# Patient Record
Sex: MALE | Race: WHITE | NOT HISPANIC OR LATINO | URBAN - METROPOLITAN AREA
[De-identification: names, ages, dates, MRNs, and addresses within clinical notes are randomized per-mention and may not be internally consistent; named-entity substitution may affect disease eponyms.]

---

## 2016-12-21 RX ORDER — AMIODARONE HYDROCHLORIDE 400 MG/1
4 TABLET ORAL
Qty: 30 | Refills: 0 | COMMUNITY
Start: 2016-12-21 | End: 2017-01-20

## 2017-01-12 ENCOUNTER — OUTPATIENT (OUTPATIENT)
Dept: OUTPATIENT SERVICES | Facility: HOSPITAL | Age: 57
LOS: 1 days | End: 2017-01-12
Payer: COMMERCIAL

## 2017-01-12 DIAGNOSIS — G47.10 HYPERSOMNIA, UNSPECIFIED: ICD-10-CM

## 2017-01-12 DIAGNOSIS — Z90.89 ACQUIRED ABSENCE OF OTHER ORGANS: Chronic | ICD-10-CM

## 2017-01-12 DIAGNOSIS — Z98.890 OTHER SPECIFIED POSTPROCEDURAL STATES: Chronic | ICD-10-CM

## 2017-01-12 PROCEDURE — 95810 POLYSOM 6/> YRS 4/> PARAM: CPT

## 2017-01-12 PROCEDURE — 95810 POLYSOM 6/> YRS 4/> PARAM: CPT | Mod: 26

## 2017-01-25 ENCOUNTER — APPOINTMENT (OUTPATIENT)
Dept: PULMONOLOGY | Facility: CLINIC | Age: 57
End: 2017-01-25

## 2017-01-31 ENCOUNTER — APPOINTMENT (OUTPATIENT)
Dept: PULMONOLOGY | Facility: CLINIC | Age: 57
End: 2017-01-31

## 2017-02-15 ENCOUNTER — APPOINTMENT (OUTPATIENT)
Dept: PULMONOLOGY | Facility: CLINIC | Age: 57
End: 2017-02-15

## 2017-02-15 VITALS
HEIGHT: 70 IN | RESPIRATION RATE: 16 BRPM | WEIGHT: 232 LBS | DIASTOLIC BLOOD PRESSURE: 100 MMHG | BODY MASS INDEX: 33.21 KG/M2 | SYSTOLIC BLOOD PRESSURE: 142 MMHG | OXYGEN SATURATION: 96 % | HEART RATE: 54 BPM

## 2017-02-15 DIAGNOSIS — Z87.09 PERSONAL HISTORY OF OTHER DISEASES OF THE RESPIRATORY SYSTEM: ICD-10-CM

## 2017-02-15 DIAGNOSIS — I25.10 ATHEROSCLEROTIC HEART DISEASE OF NATIVE CORONARY ARTERY W/OUT ANGINA PECTORIS: ICD-10-CM

## 2017-02-15 DIAGNOSIS — Z86.79 PERSONAL HISTORY OF OTHER DISEASES OF THE CIRCULATORY SYSTEM: ICD-10-CM

## 2017-02-15 DIAGNOSIS — R06.2 WHEEZING: ICD-10-CM

## 2017-02-15 DIAGNOSIS — J45.901 UNSPECIFIED ASTHMA WITH (ACUTE) EXACERBATION: ICD-10-CM

## 2017-03-29 ENCOUNTER — OUTPATIENT (OUTPATIENT)
Dept: OUTPATIENT SERVICES | Facility: HOSPITAL | Age: 57
LOS: 1 days | End: 2017-03-29
Payer: COMMERCIAL

## 2017-03-29 DIAGNOSIS — Z98.890 OTHER SPECIFIED POSTPROCEDURAL STATES: Chronic | ICD-10-CM

## 2017-03-29 DIAGNOSIS — Z90.89 ACQUIRED ABSENCE OF OTHER ORGANS: Chronic | ICD-10-CM

## 2017-03-29 DIAGNOSIS — G47.33 OBSTRUCTIVE SLEEP APNEA (ADULT) (PEDIATRIC): ICD-10-CM

## 2017-03-29 PROCEDURE — 95811 POLYSOM 6/>YRS CPAP 4/> PARM: CPT

## 2017-03-29 PROCEDURE — 95811 POLYSOM 6/>YRS CPAP 4/> PARM: CPT | Mod: 26

## 2017-06-06 ENCOUNTER — INPATIENT (INPATIENT)
Facility: HOSPITAL | Age: 57
LOS: 1 days | Discharge: HOME | DRG: 309 | End: 2017-06-08
Attending: HOSPITALIST | Admitting: HOSPITALIST
Payer: COMMERCIAL

## 2017-06-06 VITALS
SYSTOLIC BLOOD PRESSURE: 158 MMHG | RESPIRATION RATE: 16 BRPM | OXYGEN SATURATION: 97 % | WEIGHT: 240.08 LBS | HEIGHT: 71 IN | TEMPERATURE: 99 F | DIASTOLIC BLOOD PRESSURE: 96 MMHG | HEART RATE: 49 BPM

## 2017-06-06 DIAGNOSIS — T46.2X1A POISONING BY OTHER ANTIDYSRHYTHMIC DRUGS, ACCIDENTAL (UNINTENTIONAL), INITIAL ENCOUNTER: ICD-10-CM

## 2017-06-06 DIAGNOSIS — Z98.890 OTHER SPECIFIED POSTPROCEDURAL STATES: Chronic | ICD-10-CM

## 2017-06-06 DIAGNOSIS — Z90.89 ACQUIRED ABSENCE OF OTHER ORGANS: Chronic | ICD-10-CM

## 2017-06-06 PROCEDURE — 99223 1ST HOSP IP/OBS HIGH 75: CPT

## 2017-06-06 RX ORDER — LISINOPRIL 2.5 MG/1
20 TABLET ORAL DAILY
Qty: 0 | Refills: 0 | Status: DISCONTINUED | OUTPATIENT
Start: 2017-06-06 | End: 2017-06-07

## 2017-06-06 RX ORDER — ATORVASTATIN CALCIUM 80 MG/1
40 TABLET, FILM COATED ORAL AT BEDTIME
Qty: 0 | Refills: 0 | Status: DISCONTINUED | OUTPATIENT
Start: 2017-06-06 | End: 2017-06-08

## 2017-06-06 RX ORDER — APIXABAN 2.5 MG/1
5 TABLET, FILM COATED ORAL
Qty: 0 | Refills: 0 | Status: DISCONTINUED | OUTPATIENT
Start: 2017-06-06 | End: 2017-06-08

## 2017-06-06 RX ORDER — CLOPIDOGREL BISULFATE 75 MG/1
75 TABLET, FILM COATED ORAL DAILY
Qty: 0 | Refills: 0 | Status: DISCONTINUED | OUTPATIENT
Start: 2017-06-06 | End: 2017-06-08

## 2017-06-06 RX ORDER — ASPIRIN/CALCIUM CARB/MAGNESIUM 324 MG
325 TABLET ORAL DAILY
Qty: 0 | Refills: 0 | Status: DISCONTINUED | OUTPATIENT
Start: 2017-06-06 | End: 2017-06-08

## 2017-06-06 RX ADMIN — ATORVASTATIN CALCIUM 40 MILLIGRAM(S): 80 TABLET, FILM COATED ORAL at 22:22

## 2017-06-06 RX ADMIN — APIXABAN 5 MILLIGRAM(S): 2.5 TABLET, FILM COATED ORAL at 22:21

## 2017-06-06 RX ADMIN — CLOPIDOGREL BISULFATE 75 MILLIGRAM(S): 75 TABLET, FILM COATED ORAL at 22:22

## 2017-06-06 NOTE — PATIENT PROFILE ADULT. - TEACHING/LEARNING LEARNING PREFERENCES
individual instruction/written material/computer/internet/audio/skill demonstration/verbal instruction

## 2017-06-06 NOTE — H&P ADULT - PMH
Coronary artery disease involving native coronary artery of native heart, angina presence unspecified    HTN (hypertension)  non compliant with meds  RHETT treated with BiPAP    Pure hypercholesterolemia    Uncomplicated asthma, unspecified asthma severity

## 2017-06-06 NOTE — H&P ADULT - NSHPPHYSICALEXAM_GEN_ALL_CORE
Vital Signs Last 24 Hrs  T(C): 36.7, Max: 37.2 (06-06 @ 18:21)  T(F): 98, Max: 99 (06-06 @ 18:21)  HR: 47 (47 - 49)  BP: 148/82 (148/82 - 158/96)  BP(mean): --  RR: 16 (16 - 16)  SpO2: 96% (96% - 97%)    Constitutional/General: Well developed, well nourished, vitals reviewed  EYE: PERRL, conjunctiva clear  ENT: Good dentition, oropharynx clear  NECK: No masses, thyroid normal  RESP: CTAB, no wheezes, no rhonchi, normal effort  CV: S.Bradycardic, normal S1S2, no murmur, 2+ DP pulses, no JVD, no edema  ABDOMEN: Soft, nontender, no HSM  LYMPH: No cervical or supraclavicular adenopathy  SKIN: Warm, dry, no rashes  NEURO: CN II-XII intact grossly, sensation intact  PSYCHIATRIC: Oriented x3, normal mood and affect

## 2017-06-06 NOTE — H&P ADULT - HISTORY OF PRESENT ILLNESS
This is a 55 y/o male sent by his cardiologist for bradycardia. Two weeks ago his Amiodarone was increased from 200mg to 800mg and Metoprolol increased from 50mg to 100mg daily due to uncontrolled A.fib.  He denies: Dizziness, Chest pain, palpitations, nausea, vomiting. Patient has history of Severe RHETT but is non-complaint with BIPAP throughout the night.   On medication reconciliation patient is found to be taking both Lisinopril 20mg and Valsartan 80mg.  Pt indicates that his blood pressure was uncontrolled on Lisinopril 20mg at which time valsartan was added but he does not recall being told to stop lisinopril so he has been taking both since approx January.

## 2017-06-06 NOTE — H&P ADULT - ASSESSMENT
A.fib with asymptomatic bradycardia with prolonged QTC secondary to medications Amiodarone and BB.   - Admit to Telemetry  - f/u CBC,BMP, Mg, Phos, Thyroid   - Held BB, Amiodarone  - Cardiology consult appreciated.   - resume Eliquis  H/O RHETT  -	Cont BIPAP with , pt advised to bring in his own machine as he is unable to recall his settings.   -	NC  CAD s/p Stent last placed 11/16  -	Cont Plavix/aspirin   H/O HLD  -	f/u lipid panel, cont statin  H/O HTN  -	pt was taking both Lisinopril 20mg daily and Valsartan 80mg daily possibly since January.   -	D/c Lisinopril, cont Valsartan

## 2017-06-06 NOTE — CONSULT NOTE ADULT - SUBJECTIVE AND OBJECTIVE BOX
MARI DUQUE  [id number]    CC:  Patient is a 56y old  Male who presents with asymptomatic bradycardia with prolongation of the QTc interval on Amiodarone Rx.    HPI:  Has hx of cardiomyopathy, Paroxysmal atrial fibrillation, recent LAD stent, severe RHETT  Denies dyspnea, dizziness, palpitations, Chest pain, PND, orthopnea, LOC    ALLERGIES:    Sulfa  shellfish (Hives)      PAST MEDICAL & SURGICAL HISTORY:  Uncomplicated asthma, unspecified asthma severity  Pure hypercholesterolemia  Coronary artery disease involving native coronary artery of native heart, angina presence unspecified  HTN (hypertension): non compliant with meds  S/P tonsillectomy  History of umbilical hernia repair    On nocturnal CPAP mask    MEDICATIONS:  MEDICATIONS  (STANDING):  Plavix 75 mg/Day  Atorvastatin 10 mg/day  Amlodipine 5 mg/day  valsartan 80 mg/ Day  Lisinopril 30 mg/ Day  Eliquis 5 mg/ BID  Toprol XL 50 mg/ Day   mg/ Day  Amiodarone     MEDICATIONS  (PRN):    Albuterol    SOCIAL HISTORY:  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 alcohol, tobacco, drug use  Travelling consultant     FAMILY HISTORY:  Family history of heart disease (Father)  No pertinent family history in first degree relatives      ROS:  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 cough, and other than noted above full ROS is unremarkable      PHYSICAL EXAM:  Vital Signs Last 24 Hrs  T(C): --  T(F): --  HR: -- 43  BP: -- 150/ 90  BP(mean): --  RR: --12  SpO2: --  General: Patient comfortable in NAD  HEENT: NCAT, mmm, EOMI  Neck: no JVD, no carotid bruits  CVS: nl s1, split s2, no s3, no s4, no murmur or rubs, RRR  Chest: CTA b/l  Abdomen: soft, nt/nd  Extremities: No c/c/e  Neuro: A&O x3  Psych: Normal affect      ECG:Sinus John at 43  BPM flattened and broadened T waves  QTC = 481      LABS: NA      RADIOLOGY: NA        Assessment:  56y Male who presents with asymptomatic bradycardia with prolongation of the QTc interval on Amiodarone Rx.with PMHx of cardiomyopathy, Paroxysmal atrial fibrillation, recent LAD stent, severe RHETT.  Had recurrence of PAF on visit of 5/23/17 with  BPM. Was bolused with amiodarone 400 mg BID. ECG 5/26 showed restoration NSR and QTC = 448 msec.  ECG today shows SB with QTc 481    Plan:  1. Tele Bed  2. Hold Toprol and amiodarone  3. Check labs  4. CPAP

## 2017-06-07 LAB
ALBUMIN SERPL ELPH-MCNC: 3.8 G/DL — SIGNIFICANT CHANGE UP (ref 3.3–5.2)
ALP SERPL-CCNC: 90 U/L — SIGNIFICANT CHANGE UP (ref 40–120)
ALT FLD-CCNC: 16 U/L — SIGNIFICANT CHANGE UP
ANION GAP SERPL CALC-SCNC: 10 MMOL/L — SIGNIFICANT CHANGE UP (ref 5–17)
APTT BLD: 33.1 SEC — SIGNIFICANT CHANGE UP (ref 27.5–37.4)
AST SERPL-CCNC: 14 U/L — SIGNIFICANT CHANGE UP
BASOPHILS # BLD AUTO: 0 K/UL — SIGNIFICANT CHANGE UP (ref 0–0.2)
BASOPHILS NFR BLD AUTO: 0.4 % — SIGNIFICANT CHANGE UP (ref 0–2)
BILIRUB SERPL-MCNC: 0.9 MG/DL — SIGNIFICANT CHANGE UP (ref 0.4–2)
BUN SERPL-MCNC: 19 MG/DL — SIGNIFICANT CHANGE UP (ref 8–20)
CALCIUM SERPL-MCNC: 8.8 MG/DL — SIGNIFICANT CHANGE UP (ref 8.6–10.2)
CHLORIDE SERPL-SCNC: 100 MMOL/L — SIGNIFICANT CHANGE UP (ref 98–107)
CO2 SERPL-SCNC: 30 MMOL/L — HIGH (ref 22–29)
CREAT SERPL-MCNC: 1.13 MG/DL — SIGNIFICANT CHANGE UP (ref 0.5–1.3)
EOSINOPHIL # BLD AUTO: 0.2 K/UL — SIGNIFICANT CHANGE UP (ref 0–0.5)
EOSINOPHIL NFR BLD AUTO: 2 % — SIGNIFICANT CHANGE UP (ref 0–5)
GLUCOSE SERPL-MCNC: 94 MG/DL — SIGNIFICANT CHANGE UP (ref 70–115)
HCT VFR BLD CALC: 40.3 % — LOW (ref 42–52)
HGB BLD-MCNC: 13.6 G/DL — LOW (ref 14–18)
INR BLD: 1.32 RATIO — HIGH (ref 0.88–1.16)
LYMPHOCYTES # BLD AUTO: 1.1 K/UL — SIGNIFICANT CHANGE UP (ref 1–4.8)
LYMPHOCYTES # BLD AUTO: 14.1 % — LOW (ref 20–55)
MAGNESIUM SERPL-MCNC: 2.3 MG/DL — SIGNIFICANT CHANGE UP (ref 1.6–2.6)
MCHC RBC-ENTMCNC: 31.2 PG — HIGH (ref 27–31)
MCHC RBC-ENTMCNC: 33.7 G/DL — SIGNIFICANT CHANGE UP (ref 32–36)
MCV RBC AUTO: 92.4 FL — SIGNIFICANT CHANGE UP (ref 80–94)
MONOCYTES # BLD AUTO: 0.8 K/UL — SIGNIFICANT CHANGE UP (ref 0–0.8)
MONOCYTES NFR BLD AUTO: 10.7 % — HIGH (ref 3–10)
NEUTROPHILS # BLD AUTO: 5.5 K/UL — SIGNIFICANT CHANGE UP (ref 1.8–8)
NEUTROPHILS NFR BLD AUTO: 72.5 % — SIGNIFICANT CHANGE UP (ref 37–73)
PHOSPHATE SERPL-MCNC: 3.6 MG/DL — SIGNIFICANT CHANGE UP (ref 2.4–4.7)
PLATELET # BLD AUTO: 76 K/UL — LOW (ref 150–400)
POTASSIUM SERPL-MCNC: 3.3 MMOL/L — LOW (ref 3.5–5.3)
POTASSIUM SERPL-SCNC: 3.3 MMOL/L — LOW (ref 3.5–5.3)
PROT SERPL-MCNC: 6.2 G/DL — LOW (ref 6.6–8.7)
PROTHROM AB SERPL-ACNC: 14.6 SEC — HIGH (ref 9.8–12.7)
RBC # BLD: 4.36 M/UL — LOW (ref 4.6–6.2)
RBC # FLD: 13.2 % — SIGNIFICANT CHANGE UP (ref 11–15.6)
SODIUM SERPL-SCNC: 140 MMOL/L — SIGNIFICANT CHANGE UP (ref 135–145)
T3 SERPL-MCNC: 85 NG/DL — SIGNIFICANT CHANGE UP (ref 80–200)
T4 AB SER-ACNC: 6 UG/DL — SIGNIFICANT CHANGE UP (ref 4.5–12)
TSH SERPL-MCNC: 2.35 UIU/ML — SIGNIFICANT CHANGE UP (ref 0.27–4.2)
WBC # BLD: 7.6 K/UL — SIGNIFICANT CHANGE UP (ref 4.8–10.8)
WBC # FLD AUTO: 7.6 K/UL — SIGNIFICANT CHANGE UP (ref 4.8–10.8)

## 2017-06-07 PROCEDURE — 99233 SBSQ HOSP IP/OBS HIGH 50: CPT

## 2017-06-07 PROCEDURE — 93010 ELECTROCARDIOGRAM REPORT: CPT

## 2017-06-07 PROCEDURE — 99255 IP/OBS CONSLTJ NEW/EST HI 80: CPT

## 2017-06-07 RX ORDER — VALSARTAN 80 MG/1
80 TABLET ORAL DAILY
Qty: 0 | Refills: 0 | Status: DISCONTINUED | OUTPATIENT
Start: 2017-06-07 | End: 2017-06-08

## 2017-06-07 RX ORDER — POTASSIUM CHLORIDE 20 MEQ
40 PACKET (EA) ORAL ONCE
Qty: 0 | Refills: 0 | Status: COMPLETED | OUTPATIENT
Start: 2017-06-07 | End: 2017-06-07

## 2017-06-07 RX ADMIN — Medication 40 MILLIEQUIVALENT(S): at 10:05

## 2017-06-07 RX ADMIN — VALSARTAN 80 MILLIGRAM(S): 80 TABLET ORAL at 05:32

## 2017-06-07 RX ADMIN — CLOPIDOGREL BISULFATE 75 MILLIGRAM(S): 75 TABLET, FILM COATED ORAL at 12:15

## 2017-06-07 RX ADMIN — APIXABAN 5 MILLIGRAM(S): 2.5 TABLET, FILM COATED ORAL at 05:31

## 2017-06-07 RX ADMIN — APIXABAN 5 MILLIGRAM(S): 2.5 TABLET, FILM COATED ORAL at 22:14

## 2017-06-07 RX ADMIN — ATORVASTATIN CALCIUM 40 MILLIGRAM(S): 80 TABLET, FILM COATED ORAL at 22:15

## 2017-06-07 RX ADMIN — Medication 325 MILLIGRAM(S): at 12:15

## 2017-06-07 NOTE — CONSULT NOTE ADULT - ATTENDING COMMENTS
Pt with new cardiomyopathy in 12/16, and underwent CHRISTEL. Had AF at that time, controlled with amiodarone and DCCV. He had recurrent AF despite amiodarone, and was reloaded, now with sinus bradycardia (asymptomatic apart from fatigue) and markedly prolonged QT interval. Though QT prolongation usually is benign when due to amiodarone, would avoid further amiodarone and other QT prolonging medications given markedly prolonged QT interval at this time. Also keep K>4, Mg>2, Ca replete, etc.  Would repeat TTE, and if LV function still depressed, consider primary prevention ICD.  If cardiomyopathy thought to be related to AF and tachycardia, he will require optimal control of AF (or heart rates at least) to improve cardiomyopathy. He has limited good antiarrhythmic options given his cardiomyopathy, QT prolongation, and failure of amiodarone, and thus may be good candidate for catheter ablation. If this is not possible, at very least would ensure HRs < 100 bpm during AF.   For now agree with holding antiarrhythmics. Would restart lower dose metoprolol with sinus rhythm heart rates improve.

## 2017-06-07 NOTE — CONSULT NOTE ADULT - SUBJECTIVE AND OBJECTIVE BOX
MARI DUQUE  [id number]    CC:  Alonso/LQT      HPI:  57 y/o male PAF on Amio, HTN, NICM, CAD p/w bradycardia and LQT noted in our office.  Two weeks ago his Amiodarone was increased from 200mg QD to 400mg BID to reload due to uncontrolled AF. Metoprolol increased from 50mg to 100mg daily as well.  Patient seen in f/u with LQT and alonso.  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 dizziness, chest pain, palpitations, nausea, vomiting or other symptoms. Patient has history of Severe RHETT but is non-complaint with BIPAP throughout the night.  Currently remains asymptomatic.      ALLERGIES:  shellfish (Hives)  sulfa (Rash (Unknown))      PAST MEDICAL & SURGICAL HISTORY:  Uncomplicated asthma, unspecified asthma severity  Pure hypercholesterolemia  S/P tonsillectomy  History of umbilical hernia repair  Otherwise, as noted above    MEDICATIONS  (HOME):  apixaban 5milliGRAM(s) Oral two times a day  amiodarone 400mg BID  metoprolol ER 100mg BID  atorvastatin 10milliGRAM(s) Oral at bedtime  clopidogrel Tablet 75milliGRAM(s) Oral daily  aspirin 325milliGRAM(s) Oral daily  valsartan 80milliGRAM(s) Oral daily  lisinopril 30mg daily  amlodipine 5mg daily      SOCIAL HISTORY:  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 alcohol, tobacco, drug use    FAMILY HISTORY:  Family history of heart disease (Father)      ROS:  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 cough, and other than noted above full ROS is unremarkable      PHYSICAL EXAM:  Vital Signs Last 24 Hrs  T(C): 36.6, Max: 37.2 (06-06 @ 18:21)  T(F): 97.8, Max: 99 (06-06 @ 18:21)  HR: 48 (44 - 49)  BP: 142/68 (142/68 - 158/96)  BP(mean): --  RR: 14 (14 - 16)  SpO2: 97% (96% - 99%)  General: Patient comfortable in NAD  HEENT: NCAT, mmm, EOMI  Neck: no JVD, no carotid bruits  CVS: nl s1, split s2, no s3, +s4, +sys murmur, regular, alonso  Chest: CTA b/l  Abdomen: soft, nt/nd  Extremities: No c/c/e  Neuro: A&O x3  Psych: Normal affect      ECG:  Pending      LABS:                        13.6   7.6   )-----------( 76       ( 07 Jun 2017 04:54 )             40.3     06-07    140  |  100  |  19.0  ----------------------------<  94  3.3<L>   |  30.0<H>  |  1.13    Ca    8.8      07 Jun 2017 04:54  Phos  3.6     06-07  Mg     2.3     06-07    TPro  6.2<L>  /  Alb  3.8  /  TBili  0.9  /  DBili  x   /  AST  14  /  ALT  16  /  AlkPhos  90  06-07        PT/INR - ( 07 Jun 2017 04:54 )   PT: 14.6 sec;   INR: 1.32 ratio         PTT - ( 07 Jun 2017 04:54 )  PTT:33.1 sec          Assessment:  57 y/o male PAF on Amio, HTN, NICM, CAD p/w bradycardia and LQT noted in our office.  Two weeks ago his Amiodarone was increased from 200mg QD to 400mg BID to reload due to uncontrolled AF and metoprolol increased from 50mg to 100mg daily as well.  No symptoms.  Tele with SR with no ventricular arrhythmia or HB, alonso to 40s.    Plan:  1. Tele  2. Hold Amio and BB  3. Continue other current CV meds at current doses  4. Daily EKG  5. Likely d/c off Amio in next day or 2    Will follow.  Thanks!

## 2017-06-07 NOTE — PROGRESS NOTE ADULT - SUBJECTIVE AND OBJECTIVE BOX
CC: Arrhythmias - bradycardia , Qtc prolongation on antiarrhythmic drugs for afib.  Amiodarone held and monitoring cardiac rhythm. Patient denied chest pains or shortness of breath today. Has been walking down the hallways.    HPI:  This is a 57 y/o male sent by his cardiologist for bradycardia. Two weeks ago his Amiodarone was increased from 200mg to 800mg and Metoprolol increased from 50mg to 100mg daily due to uncontrolled A.fib.  He denies: Dizziness, Chest pain, palpitations, nausea, vomiting. Patient has history of Severe RHETT but is non-complaint with BIPAP throughout the night.   On medication reconciliation patient is found to be taking both Lisinopril 20mg and Valsartan 80mg.  Pt indicates that his blood pressure was uncontrolled on Lisinopril 20mg at which time valsartan was added but he does not recall being told to stop lisinopril so he has been taking both since approx January. (06 Jun 2017 21:16)    REVIEW OF SYSTEMS:    Patient denied fever, chills, abdominal pain, nausea, vomiting, cough, shortness of breath, chest pain or palpitations    Vital Signs Last 24 Hrs  T(C): 36.6, Max: 37.2 (06-06 @ 18:21)  T(F): 97.8, Max: 99 (06-06 @ 18:21)  HR: 48 (44 - 49)  BP: 142/68 (142/68 - 158/96)  BP(mean): --  RR: 14 (14 - 16)  SpO2: 97% (96% - 99%)I&O's Summary  I & Os for 24h ending 07 Jun 2017 07:00  =============================================  IN: 0 ml / OUT: 400 ml / NET: -400 ml    I & Os for current day (as of 07 Jun 2017 09:43)  =============================================  IN: 0 ml / OUT: 325 ml / NET: -325 ml    PHYSICAL EXAM:  GENERAL: NAD, well-groomed  HEENT: PERRL, +EOMI, anicteric, no [place]  NECK: Supple, No JVD   CHEST/LUNG: CTA bilaterally; Normal effort  HEART: S1S2 Normal intensity, no murmurs, gallops or rubs noted  ABDOMEN: Soft, BS Normoactive, NT, ND, no HSM noted  EXTREMITIES:  2+ radial and DP pulses noted, no clubbing, cyanosis, or edema noted, FROM x 4  SKIN: No rashes or lesions noted  NEURO: A&Ox3, no focal deficits noted, CN II-XII intact  PSYCH: normal mood and affect; insight/judgement appropriate  LABS:                        13.6   7.6   )-----------( 76       ( 07 Jun 2017 04:54 )             40.3     06-07    140  |  100  |  19.0  ----------------------------<  94  3.3<L>   |  30.0<H>  |  1.13    Ca    8.8      07 Jun 2017 04:54  Phos  3.6     06-07  Mg     2.3     06-07    TPro  6.2<L>  /  Alb  3.8  /  TBili  0.9  /  DBili  x   /  AST  14  /  ALT  16  /  AlkPhos  90  06-07    PT/INR - ( 07 Jun 2017 04:54 )   PT: 14.6 sec;   INR: 1.32 ratio         PTT - ( 07 Jun 2017 04:54 )  PTT:33.1 sec    RADIOLOGY & ADDITIONAL TESTS:    MEDICATIONS:  MEDICATIONS  (STANDING):  apixaban 5milliGRAM(s) Oral two times a day  atorvastatin 40milliGRAM(s) Oral at bedtime  clopidogrel Tablet 75milliGRAM(s) Oral daily  aspirin 325milliGRAM(s) Oral daily  valsartan 80milliGRAM(s) Oral daily    MEDICATIONS  (PRN):

## 2017-06-07 NOTE — PROGRESS NOTE ADULT - ASSESSMENT
Detail Level: Zone A.fib with asymptomatic bradycardia with prolonged QTC secondary to medications Amiodarone and BB.      Telemetry monitoring   Holding beta blocker and Amiodarone  Continue Apixaban 5mg po bid   TSH , serum mag are normal levels  - Cardiology consult following dr Hinds team     RHETT  Cont BIPAP with , pt advised to bring in his own machine as he is unable to recall his settings.     CAD s/p Stent last placed 11/16  Cont Plavix 75mg po daily  Aspirin 325mg po daily     Hyperlidpiedima   Atorvastatin 40mg po daily   f/u lipid panel    Hypertension   pt was taking both Lisinopril 20mg daily and Valsartan 80mg daily possibly since January.   D/c Lisinopril, continue  Valsartan 80mg po daily

## 2017-06-07 NOTE — CONSULT NOTE ADULT - SUBJECTIVE AND OBJECTIVE BOX
55 yo obese male with pmhx HTN, HLD, CAD s/p PCI [facility] LAD x 2 2016 [facility] HFrEF 30% dx 2016, RHETT on CPAP, PAF on eliquis. He went for a scheduled follow up with his cardiologist Dr Perez approximately 2 weeks ago and was told he was in AF. His amiodarone was increased from 200mg daily to 400mg bid and his toprol was increased from 50mg daily to 100mg daily. Prior to visit he denies specific symptoms from AF but admits to fatigue since hospitalization in December. He had ECG at [facility] 3 days later and was in NSR. He continued meds and went in for follow up yesterday and was referred to ER secondary to bradycardia and prolonged QTc. He feels well today and denies recent chest pain, SOB, MORAN, LE edema. He walks on treadmill for 30 mins 3x weekly without symptoms. He recalls being diagnosed with AF requiring DCCV in december and cardiomyopathy. Unclear if he has had repeat TTE.    Telemetry review: SB 45bpm, paroxysmal slow Aflutter~100bpm  EC16 7:27am: SB 45bpm QT 750msec/QTc 650msec      PAST MEDICAL & SURGICAL HISTORY:  RHETT treated with BiPAP  Uncomplicated asthma, unspecified asthma severity  Pure hypercholesterolemia  Coronary artery disease involving native coronary artery of native heart, angina presence unspecified  HTN (hypertension): non compliant with meds  S/P tonsillectomy  History of umbilical hernia repair    Medications:  apixaban 5milliGRAM(s) Oral two times a day  atorvastatin 40milliGRAM(s) Oral at bedtime  clopidogrel Tablet 75milliGRAM(s) Oral daily  aspirin 325milliGRAM(s) Oral daily  valsartan 80milliGRAM(s) Oral daily      Allergies  shellfish (Hives)  sulfa drugs (Rash (Unknown))  Intolerances      SOCIAL HISTORY:, works for [employer], denies smoking, occ ETOH, denies ETOH    FAMILY HISTORY: denies family hx of SCD  Family history of heart disease (Father)  No pertinent family history in first degree relatives      Review of Systems:    CONSTITUTIONAL: No fever, weight loss, +fatigue  EYES: No eye pain, visual disturbances, or discharge  ENMT:  No difficulty hearing, tinnitus, vertigo; No sinus or throat pain  NECK: No pain or stiffness  BREASTS: No pain, masses, or nipple discharge  RESPIRATORY: No cough, wheezing, chills or hemoptysis; No shortness of breath  CARDIOVASCULAR: per hpi  GASTROINTESTINAL: No abdominal or epigastric pain. No nausea, vomiting, or hematemesis; No diarrhea or constipation. No melena or hematochezia.  GENITOURINARY: No dysuria, frequency, hematuria, or incontinence  NEUROLOGICAL: No headaches, memory loss, loss of strength, numbness, or tremors  SKIN: No itching, burning, rashes, or lesions   LYMPH NODES: No enlarged glands  ENDOCRINE: No heat or cold intolerance; No hair loss  MUSCULOSKELETAL: No joint pain or swelling; No muscle, back, or extremity pain  PSYCHIATRIC: No depression, anxiety, mood swings, or difficulty sleeping  HEME/LYMPH: No easy bruising, or bleeding gums  ALLERY AND IMMUNOLOGIC: No hives or eczema    Vital Signs Last 24 Hrs  T(C): 36.6, Max: 37.2 ( @ 18:21)  T(F): 97.8, Max: 99 ( @ 18:21)  HR: 46 (44 - 49)  BP: 158/80 (142/68 - 158/96)  RR: 14 (14 - 16)  SpO2: 95% (95% - 99%)    Physical Exam:  Constitutional: obese, AAOx3, NAD  Neck: supple, No JVD  Cardiovascular: bradycardic, no murmurs, rubs, gallops   Pulmonary: CTA b/l, unlabored, no wheezes, rales. rhonci  Abdomen: +BS, soft NTND  Extremities: no edema b/l, +distal pulses b/l  Neuro: non focal, speech clear, CASANOVA x 4    LABS:                        13.6   7.6   )-----------( 76       ( 2017 04:54 )             40.3   06-07    140  |  100  |  19.0  ----------------------------<  94  3.3<L>   |  30.0<H>  |  1.13    Ca    8.8      2017 04:54  Phos  3.6     06-07  Mg     2.3     06-07    TPro  6.2<L>  /  Alb  3.8  /  TBili  0.9  /  DBili  x   /  AST  14  /  ALT  16  /  AlkPhos  90  06-07  LIVER FUNCTIONS - ( 2017 04:54 )  Alb: 3.8 g/dL / Pro: 6.2 g/dL / ALK PHOS: 90 U/L / ALT: 16 U/L / AST: 14 U/L / GGT: x           PT/INR - ( 2017 04:54 )   PT: 14.6 sec;   INR: 1.32 ratio    PTT - ( 2017 04:54 )  PTT:33.1 sec    EXAM:  ECHO TTE W CON COMPLETE W DOPP    PROCEDURE DATE:  Dec 17 2016    Summary:   1. Technically difficult study.   2. Normal left ventricular internal cavity size.   3. Left ventricular ejection fraction, by visual estimation, is 30 to   35%.   4. Moderately decreased global left ventricular systolic function.   5. Left atrial enlargement.   6. Moderately dilated right atrium.   Fracisco Sood MD Electronically signed on 2016 at 4:40:45 PM      Cath Lab Report -- Comprehensive Report  Patient: MARI DUQUE  Study date: 2016  VENTRICLES: No LV gram was performed; however, a recent echocardiogram  demonstrated an EF of 30 %.  CORONARY VESSELS: The coronary circulation is right dominant.  LM:   --  LM: Normal.  LAD:   --  Proximal LAD: There was a 70 % stenosis.  --  Mid LAD: There was a 80 % stenosis in-stent.  CX:   --  Ostial circumflex: There was a 50 % stenosis. FFR 0.94.  RCA:   --  RCA: Normal.  COMPLICATIONS: No complications occurred during the cath lab visit.  DIAGNOSTIC IMPRESSIONS: Severe proximal and mid LAD disease. PCI performed  with 2 CHRISTEL.  DIAGNOSTIC RECOMMENDATIONS: Aspirin and Plavix.  INTERVENTIONAL IMPRESSIONS: Severe proximal and mid LAD disease. PCI  performed with 2 CHRISTEL.  INTERVENTIONAL RECOMMENDATIONS: Aspirin and Plavix.  Prepared and signed by  Taj Sue MD      EC2016  Ventricular Rate 52 BPM  Atrial Rate 54 BPM  QRS Duration 90 ms  Q-T Interval 442 ms  QTC Calculation(Bezet) 411 ms  R Axis 39 degrees  T Axis -57 degrees  Diagnosis Line Junctional rhythm  Cannot rule out Inferior infarct , age undetermined  Abnormal ECG    Confirmed by INDERJIT SANTOS ([id number]) on 2016 10:22:05 PM 55 yo obese male with pmhx HTN, HLD, CAD s/p PCI [facility] LAD [facility] 2 2016 [facility] HFrEF 30% dx 2016, RHETT on CPAP, PAF on eliquis. He went for a scheduled follow up with his cardiologist Dr Perez approximately 2 weeks ago and was told he was in AF. His amiodarone was increased from 200mg daily to 400mg bid and his toprol was increased from 50mg daily to 100mg daily. Prior to visit he denies specific symptoms from AF but admits to fatigue since hospitalization in December. He had ECG at [facility] 3 days later and was in NSR. He continued meds and went in for follow up yesterday and was referred to ER secondary to bradycardia and prolonged QTc. He feels well today and denies recent chest pain, SOB, MORAN, LE edema. He walks on treadmill for 30 mins 3x weekly without symptoms. He recalls being diagnosed with AF requiring DCCV in december and cardiomyopathy. Unclear if he has had repeat TTE.    Telemetry review: SB 45bpm, paroxysmal slow Aflutter~100bpm  EC16 7:27am: SB 45bpm QT 750msec/QTc 650msec      PAST MEDICAL & SURGICAL HISTORY:  HRETT treated with BiPAP  Uncomplicated asthma, unspecified asthma severity  Pure hypercholesterolemia  Coronary artery disease involving native coronary artery of native heart, angina presence unspecified  HTN (hypertension): non compliant with meds  S/P tonsillectomy  History of umbilical hernia repair    Medications:  apixaban 5milliGRAM(s) Oral two times a day  atorvastatin 40milliGRAM(s) Oral at bedtime  clopidogrel Tablet 75milliGRAM(s) Oral daily  aspirin 325milliGRAM(s) Oral daily  valsartan 80milliGRAM(s) Oral daily      Allergies  shellfish (Hives)  sulfa drugs (Rash (Unknown))  Intolerances      SOCIAL HISTORY:, works for [employer], denies smoking, occ ETOH, denies ETOH    FAMILY HISTORY: denies family hx of SCD  Family history of heart disease (Father)  No pertinent family history in first degree relatives      Review of Systems:    CONSTITUTIONAL: No fever, weight loss, +fatigue  EYES: No eye pain, visual disturbances, or discharge  ENMT:  No difficulty hearing, tinnitus, vertigo; No sinus or throat pain  NECK: No pain or stiffness  BREASTS: No pain, masses, or nipple discharge  RESPIRATORY: No cough, wheezing, chills or hemoptysis; No shortness of breath  CARDIOVASCULAR: per hpi  GASTROINTESTINAL: No abdominal or epigastric pain. No nausea, vomiting, or hematemesis; No diarrhea or constipation. No melena or hematochezia.  GENITOURINARY: No dysuria, frequency, hematuria, or incontinence  NEUROLOGICAL: No headaches, memory loss, loss of strength, numbness, or tremors  SKIN: No itching, burning, rashes, or lesions   LYMPH NODES: No enlarged glands  ENDOCRINE: No heat or cold intolerance; No hair loss  MUSCULOSKELETAL: No joint pain or swelling; No muscle, back, or extremity pain  PSYCHIATRIC: No depression, anxiety, mood swings, or difficulty sleeping  HEME/LYMPH: No easy bruising, or bleeding gums  ALLERY AND IMMUNOLOGIC: No hives or eczema    Vital Signs Last 24 Hrs  T(C): 36.6, Max: 37.2 ( @ 18:21)  T(F): 97.8, Max: 99 ( @ 18:21)  HR: 46 (44 - 49)  BP: 158/80 (142/68 - 158/96)  RR: 14 (14 - 16)  SpO2: 95% (95% - 99%)    Physical Exam:  Constitutional: obese, AAOx3, NAD  Neck: supple, No JVD  Cardiovascular: bradycardic, +murmur  Pulmonary: CTA b/l, unlabored, no wheezes, rales. rhonci  Abdomen: +BS, soft NTND  Extremities: no edema b/l, +distal pulses b/l  Neuro: non focal, speech clear, CASANOVA x 4    LABS:                        13.6   7.6   )-----------( 76       ( 2017 04:54 )             40.3   06-07    140  |  100  |  19.0  ----------------------------<  94  3.3<L>   |  30.0<H>  |  1.13    Ca    8.8      2017 04:54  Phos  3.6     06-07  Mg     2.3     06-07    Thyroid Stimulating Hormone, Serum (17 @ 04:54)    Thyroid Stimulating Hormone, Serum: 2.35 uIU/mL      TPro  6.2<L>  /  Alb  3.8  /  TBili  0.9  /  DBili  x   /  AST  14  /  ALT  16  /  AlkPhos  90  -  LIVER FUNCTIONS - ( 2017 04:54 )  Alb: 3.8 g/dL / Pro: 6.2 g/dL / ALK PHOS: 90 U/L / ALT: 16 U/L / AST: 14 U/L / GGT: x           PT/INR - ( 2017 04:54 )   PT: 14.6 sec;   INR: 1.32 ratio    PTT - ( 2017 04:54 )  PTT:33.1 sec    EXAM:  ECHO TTE W CON COMPLETE W DOPP    PROCEDURE DATE:  Dec 17 2016    Summary:   1. Technically difficult study.   2. Normal left ventricular internal cavity size.   3. Left ventricular ejection fraction, by visual estimation, is 30 to   35%.   4. Moderately decreased global left ventricular systolic function.   5. Left atrial enlargement.   6. Moderately dilated right atrium.   Fracisco Sood MD Electronically signed on 2016 at 4:40:45 PM    EXAM:  ECHO TRANSESOPHAGEAL    EXAM:  DOPPLER ECHO COMP W SPECTRAL    EXAM:  DOPPLER ECHOCARD COLOR FLOW    PROCEDURE DATE:  Dec 20 2016 Summary:   1. Mildly increased left ventricular internal cavity size.   2. Severely decreased global left ventricular systolic function.   3. Left ventricular ejection fraction, by visual estimation, is approx   20%.   4. Global cardiomyopathy.   5. Moderately dilated right atrium.   6. Moderate to severe left atrial enlargement.   7. Technically good study.   8. Mild mitral annular calcification.   9. Thickening of the anterior and posterior mitral valve leaflets.  10. Moderate mitral valve regurgitation.  11. Moderate tricuspid regurgitation.  12. Normal trileaflet aortic valve with normal opening.  13. Structurally normal pulmonic valve, with normal leaflet excursion.  14. Dilated pulmonary vein(s) as described above.  15. Moderately sized patent foramen ovale, with predominantly left to   right shunting across the atrial septum.  16. Mobile intra-atrial septum.  17. There was no evidence of an intracardiac thrombus and we proceded   directly to cardioversion     Shaan Perez MD Electronically signed on 2016 at 12:16:50 PM    Cath Lab Report -- Comprehensive Report  Patient: MARI DUQUE  Study date: 2016  VENTRICLES: No LV gram was performed; however, a recent echocardiogram  demonstrated an EF of 30 %.  CORONARY VESSELS: The coronary circulation is right dominant.  LM:   --  LM: Normal.  LAD:   --  Proximal LAD: There was a 70 % stenosis.  --  Mid LAD: There was a 80 % stenosis in-stent.  CX:   --  Ostial circumflex: There was a 50 % stenosis. FFR 0.94.  RCA:   --  RCA: Normal.  COMPLICATIONS: No complications occurred during the cath lab visit.  DIAGNOSTIC IMPRESSIONS: Severe proximal and mid LAD disease. PCI performed  with 2 CHRISTEL.  DIAGNOSTIC RECOMMENDATIONS: Aspirin and Plavix.  INTERVENTIONAL IMPRESSIONS: Severe proximal and mid LAD disease. PCI  performed with 2 CHRISTEL.  INTERVENTIONAL RECOMMENDATIONS: Aspirin and Plavix.  Prepared and signed by  Taj Sue MD      EC2016  Ventricular Rate 52 BPM  Atrial Rate 54 BPM  QRS Duration 90 ms  Q-T Interval 442 ms  QTC Calculation(Bezet) 411 ms  R Axis 39 degrees  T Axis -57 degrees  Diagnosis Line Junctional rhythm  Cannot rule out Inferior infarct , age undetermined  Abnormal ECG  Confirmed by INDERJIT SANTOS ([id number]) on 2016 10:22:05 PM    A/P: 55 yo obese male with pmhx HTN, HLD, [facility] HFrEF 30% dx 2016, RHETT on CPAP, PAF on eliquis with recent reload of po amiodarone and up titration of metoprolol admitted with bradycardia and prolonged QT. Telemetry reviewed without evidence of heart block or ventricular arrythmia, he is having asymptomatic short burst of slow atrial flutter. On prior admission in December TTE and CODY were significant for mod-severely enlarged LA, severely depressed EF and moderate MR. He required DCCV for AF. He reports being compliant with his medications including anticoagulation.   -continue tele monitoring with prolonged QTc, keep k>4, mg>2.0  -daily ECG  -continue to hold amio/avnodal blocking agents  -continue eliquis  -repeat TTE to reassess EF if not performed recently as oupt  -will guzman Meyer, pt may benefit from AF ablation, which may allow us to avoid AAD and if tachyarrhythmias adding to LV dysfunction aid in cardiac remodeling. If EF remains <30% would recommend primary prevention ICD, dual chamber if bradycardia persists. 55 yo obese male with pmhx HTN, HLD, CAD s/[facility] HFrEF 30% dx 2016, RHETT on CPAP, PAF on eliquis. He went for a scheduled follow up with his cardiologist Dr Perez approximately 2 weeks ago and was told he was in AF. His amiodarone was increased from 200mg daily to 400mg bid and his toprol was increased from 50mg daily to 100mg daily. Prior to visit he denies specific symptoms from AF but admits to fatigue since hospitalization in December. He had ECG at [facility] 3 days later and was in NSR. He continued meds and went in for follow up yesterday and was referred to ER secondary to bradycardia and prolonged QTc. He feels well today and denies recent chest pain, SOB, MORAN, LE edema. He walks on treadmill for 30 mins 3x weekly without symptoms. He recalls being diagnosed with AF requiring DCCV in december and cardiomyopathy. Unclear if he has had repeat TTE.    Telemetry review: sinus rhythm 40s-50s bpm. ?arrhythmia likely artifact  EC16 7:27am: SB 45bpm QT 750msec/QTc 650msec      PAST MEDICAL & SURGICAL HISTORY:  RHETT treated with BiPAP  Uncomplicated asthma, unspecified asthma severity  Pure hypercholesterolemia  Coronary artery disease involving native coronary artery of native heart, angina presence unspecified  HTN (hypertension): non compliant with meds  S/P tonsillectomy  History of umbilical hernia repair    Medications:  apixaban 5milliGRAM(s) Oral two times a day  atorvastatin 40milliGRAM(s) Oral at bedtime  clopidogrel Tablet 75milliGRAM(s) Oral daily  aspirin 325milliGRAM(s) Oral daily  valsartan 80milliGRAM(s) Oral daily      Allergies  shellfish (Hives)  sulfa drugs (Rash (Unknown))  Intolerances      SOCIAL HISTORY:, works for [employer], denies smoking, occ ETOH, denies ETOH    FAMILY HISTORY: denies family hx of SCD  Family history of heart disease (Father)  No pertinent family history in first degree relatives      Review of Systems:    CONSTITUTIONAL: No fever, weight loss, +fatigue  EYES: No eye pain, visual disturbances, or discharge  ENMT:  No difficulty hearing, tinnitus, vertigo; No sinus or throat pain  NECK: No pain or stiffness  BREASTS: No pain, masses, or nipple discharge  RESPIRATORY: No cough, wheezing, chills or hemoptysis; No shortness of breath  CARDIOVASCULAR: per hpi  GASTROINTESTINAL: No abdominal or epigastric pain. No nausea, vomiting, or hematemesis; No diarrhea or constipation. No melena or hematochezia.  GENITOURINARY: No dysuria, frequency, hematuria, or incontinence  NEUROLOGICAL: No headaches, memory loss, loss of strength, numbness, or tremors  SKIN: No itching, burning, rashes, or lesions   LYMPH NODES: No enlarged glands  ENDOCRINE: No heat or cold intolerance; No hair loss  MUSCULOSKELETAL: No joint pain or swelling; No muscle, back, or extremity pain  PSYCHIATRIC: No depression, anxiety, mood swings, or difficulty sleeping  HEME/LYMPH: No easy bruising, or bleeding gums  ALLERY AND IMMUNOLOGIC: No hives or eczema    Vital Signs Last 24 Hrs  T(C): 36.6, Max: 37.2 ( @ 18:21)  T(F): 97.8, Max: 99 ( @ 18:21)  HR: 46 (44 - 49)  BP: 158/80 (142/68 - 158/96)  RR: 14 (14 - 16)  SpO2: 95% (95% - 99%)    Physical Exam:  Constitutional: obese, AAOx3, NAD  Neck: supple, No JVD  Cardiovascular: bradycardic, +murmur  Pulmonary: CTA b/l, unlabored, no wheezes, rales. rhonci  Abdomen: +BS, soft NTND  Extremities: no edema b/l, +distal pulses b/l  Neuro: non focal, speech clear, CASANOVA x 4    LABS:                        13.6   7.6   )-----------( 76       ( 2017 04:54 )             40.3   06-07    140  |  100  |  19.0  ----------------------------<  94  3.3<L>   |  30.0<H>  |  1.13    Ca    8.8      2017 04:54  Phos  3.6     06-07  Mg     2.3     06-07    Thyroid Stimulating Hormone, Serum (17 @ 04:54)    Thyroid Stimulating Hormone, Serum: 2.35 uIU/mL      TPro  6.2<L>  /  Alb  3.8  /  TBili  0.9  /  DBili  x   /  AST  14  /  ALT  16  /  AlkPhos  90  -07  LIVER FUNCTIONS - ( 2017 04:54 )  Alb: 3.8 g/dL / Pro: 6.2 g/dL / ALK PHOS: 90 U/L / ALT: 16 U/L / AST: 14 U/L / GGT: x           PT/INR - ( 2017 04:54 )   PT: 14.6 sec;   INR: 1.32 ratio    PTT - ( 2017 04:54 )  PTT:33.1 sec    EXAM:  ECHO TTE W CON COMPLETE W DOPP    PROCEDURE DATE:  Dec 17 2016    Summary:   1. Technically difficult study.   2. Normal left ventricular internal cavity size.   3. Left ventricular ejection fraction, by visual estimation, is 30 to   35%.   4. Moderately decreased global left ventricular systolic function.   5. Left atrial enlargement.   6. Moderately dilated right atrium.   Fracisco Sood MD Electronically signed on 2016 at 4:40:45 PM    EXAM:  ECHO TRANSESOPHAGEAL    EXAM:  DOPPLER ECHO COMP W SPECTRAL    EXAM:  DOPPLER ECHOCARD COLOR FLOW    PROCEDURE DATE:  Dec 20 2016 Summary:   1. Mildly increased left ventricular internal cavity size.   2. Severely decreased global left ventricular systolic function.   3. Left ventricular ejection fraction, by visual estimation, is approx   20%.   4. Global cardiomyopathy.   5. Moderately dilated right atrium.   6. Moderate to severe left atrial enlargement.   7. Technically good study.   8. Mild mitral annular calcification.   9. Thickening of the anterior and posterior mitral valve leaflets.  10. Moderate mitral valve regurgitation.  11. Moderate tricuspid regurgitation.  12. Normal trileaflet aortic valve with normal opening.  13. Structurally normal pulmonic valve, with normal leaflet excursion.  14. Dilated pulmonary vein(s) as described above.  15. Moderately sized patent foramen ovale, with predominantly left to   right shunting across the atrial septum.  16. Mobile intra-atrial septum.  17. There was no evidence of an intracardiac thrombus and we proceded   directly to cardioversion     Shaan Perez MD Electronically signed on 2016 at 12:16:50 PM    Cath Lab Report -- Comprehensive Report  Patient: MARI DUQUE  Study date: 2016  VENTRICLES: No LV gram was performed; however, a recent echocardiogram  demonstrated an EF of 30 %.  CORONARY VESSELS: The coronary circulation is right dominant.  LM:   --  LM: Normal.  LAD:   --  Proximal LAD: There was a 70 % stenosis.  --  Mid LAD: There was a 80 % stenosis in-stent.  CX:   --  Ostial circumflex: There was a 50 % stenosis. FFR 0.94.  RCA:   --  RCA: Normal.  COMPLICATIONS: No complications occurred during the cath lab visit.  DIAGNOSTIC IMPRESSIONS: Severe proximal and mid LAD disease. PCI performed  with 2 CHRISTEL.  DIAGNOSTIC RECOMMENDATIONS: Aspirin and Plavix.  INTERVENTIONAL IMPRESSIONS: Severe proximal and mid LAD disease. PCI  performed with 2 CHRISTEL.  INTERVENTIONAL RECOMMENDATIONS: Aspirin and Plavix.  Prepared and signed by  Taj Sue MD      EC2016  Ventricular Rate 52 BPM  Atrial Rate 54 BPM  QRS Duration 90 ms  Q-T Interval 442 ms  QTC Calculation(Bezet) 411 ms  R Axis 39 degrees  T Axis -57 degrees  Diagnosis Line Junctional rhythm  Cannot rule out Inferior infarct , age undetermined  Abnormal ECG  Confirmed by INDERJIT SANTOS ([id number]) on 2016 10:22:05 PM    A/P: 55 yo obese male with pmhx HTN, HLD, CAD [facility]/p [facility] HFrEF 30% dx 2016, RHETT on CPAP, PAF on eliquis with recent reload of po amiodarone and up titration of metoprolol admitted with bradycardia and prolonged QT. On prior admission in December TTE and CODY were significant for mod-severely enlarged LA, severely depressed EF and moderate MR. He required DCCV for AF. He reports being compliant with his medications including anticoagulation.   -continue tele monitoring with prolonged QTc, keep k>4, mg>2.0  -daily ECG  -continue to hold amio/avnodal blocking agents  -continue eliquis  -repeat TTE to reassess EF if not performed recently as oupt  -will guzman Meyer, pt may benefit from AF ablation, which may allow us to avoid AAD and if tachyarrhythmias adding to LV dysfunction aid in cardiac remodeling. If EF remains <30% would recommend primary prevention ICD, dual chamber if bradycardia persists.

## 2017-06-08 VITALS
OXYGEN SATURATION: 99 % | RESPIRATION RATE: 16 BRPM | HEART RATE: 57 BPM | TEMPERATURE: 98 F | SYSTOLIC BLOOD PRESSURE: 130 MMHG | DIASTOLIC BLOOD PRESSURE: 68 MMHG

## 2017-06-08 LAB
ANION GAP SERPL CALC-SCNC: 14 MMOL/L — SIGNIFICANT CHANGE UP (ref 5–17)
BUN SERPL-MCNC: 17 MG/DL — SIGNIFICANT CHANGE UP (ref 8–20)
CALCIUM SERPL-MCNC: 9.2 MG/DL — SIGNIFICANT CHANGE UP (ref 8.6–10.2)
CHLORIDE SERPL-SCNC: 104 MMOL/L — SIGNIFICANT CHANGE UP (ref 98–107)
CHOLEST SERPL-MCNC: 143 MG/DL — SIGNIFICANT CHANGE UP (ref 110–199)
CO2 SERPL-SCNC: 28 MMOL/L — SIGNIFICANT CHANGE UP (ref 22–29)
CREAT SERPL-MCNC: 0.96 MG/DL — SIGNIFICANT CHANGE UP (ref 0.5–1.3)
GLUCOSE SERPL-MCNC: 94 MG/DL — SIGNIFICANT CHANGE UP (ref 70–115)
HCT VFR BLD CALC: 46.7 % — SIGNIFICANT CHANGE UP (ref 42–52)
HDLC SERPL-MCNC: 54 MG/DL — LOW
HGB BLD-MCNC: 16 G/DL — SIGNIFICANT CHANGE UP (ref 14–18)
LIPID PNL WITH DIRECT LDL SERPL: 59 MG/DL — SIGNIFICANT CHANGE UP
MAGNESIUM SERPL-MCNC: 2.3 MG/DL — SIGNIFICANT CHANGE UP (ref 1.6–2.6)
MCHC RBC-ENTMCNC: 31.6 PG — HIGH (ref 27–31)
MCHC RBC-ENTMCNC: 34.3 G/DL — SIGNIFICANT CHANGE UP (ref 32–36)
MCV RBC AUTO: 92.3 FL — SIGNIFICANT CHANGE UP (ref 80–94)
PLATELET # BLD AUTO: 87 K/UL — LOW (ref 150–400)
POTASSIUM SERPL-MCNC: 3.6 MMOL/L — SIGNIFICANT CHANGE UP (ref 3.5–5.3)
POTASSIUM SERPL-SCNC: 3.6 MMOL/L — SIGNIFICANT CHANGE UP (ref 3.5–5.3)
RBC # BLD: 5.06 M/UL — SIGNIFICANT CHANGE UP (ref 4.6–6.2)
RBC # FLD: 13.2 % — SIGNIFICANT CHANGE UP (ref 11–15.6)
SODIUM SERPL-SCNC: 146 MMOL/L — HIGH (ref 135–145)
TOTAL CHOLESTEROL/HDL RATIO MEASUREMENT: 3 RATIO — LOW (ref 3.4–9.6)
TRIGL SERPL-MCNC: 150 MG/DL — SIGNIFICANT CHANGE UP (ref 10–200)
WBC # BLD: 7.6 K/UL — SIGNIFICANT CHANGE UP (ref 4.8–10.8)
WBC # FLD AUTO: 7.6 K/UL — SIGNIFICANT CHANGE UP (ref 4.8–10.8)

## 2017-06-08 PROCEDURE — 84443 ASSAY THYROID STIM HORMONE: CPT

## 2017-06-08 PROCEDURE — 84436 ASSAY OF TOTAL THYROXINE: CPT

## 2017-06-08 PROCEDURE — 80048 BASIC METABOLIC PNL TOTAL CA: CPT

## 2017-06-08 PROCEDURE — 85027 COMPLETE CBC AUTOMATED: CPT

## 2017-06-08 PROCEDURE — 85610 PROTHROMBIN TIME: CPT

## 2017-06-08 PROCEDURE — 80053 COMPREHEN METABOLIC PANEL: CPT

## 2017-06-08 PROCEDURE — 99233 SBSQ HOSP IP/OBS HIGH 50: CPT

## 2017-06-08 PROCEDURE — 93005 ELECTROCARDIOGRAM TRACING: CPT

## 2017-06-08 PROCEDURE — 93010 ELECTROCARDIOGRAM REPORT: CPT

## 2017-06-08 PROCEDURE — 83735 ASSAY OF MAGNESIUM: CPT

## 2017-06-08 PROCEDURE — 94660 CPAP INITIATION&MGMT: CPT

## 2017-06-08 PROCEDURE — 84480 ASSAY TRIIODOTHYRONINE (T3): CPT

## 2017-06-08 PROCEDURE — 36415 COLL VENOUS BLD VENIPUNCTURE: CPT

## 2017-06-08 PROCEDURE — 93306 TTE W/DOPPLER COMPLETE: CPT

## 2017-06-08 PROCEDURE — 84100 ASSAY OF PHOSPHORUS: CPT

## 2017-06-08 PROCEDURE — 80061 LIPID PANEL: CPT

## 2017-06-08 PROCEDURE — 85730 THROMBOPLASTIN TIME PARTIAL: CPT

## 2017-06-08 RX ORDER — POTASSIUM CHLORIDE 20 MEQ
20 PACKET (EA) ORAL ONCE
Qty: 0 | Refills: 0 | Status: COMPLETED | OUTPATIENT
Start: 2017-06-08 | End: 2017-06-08

## 2017-06-08 RX ORDER — METOPROLOL TARTRATE 50 MG
1 TABLET ORAL
Qty: 30 | Refills: 0
Start: 2017-06-08 | End: 2019-03-10

## 2017-06-08 RX ORDER — LISINOPRIL 2.5 MG/1
1 TABLET ORAL
Qty: 0 | Refills: 0 | COMMUNITY

## 2017-06-08 RX ORDER — METOPROLOL TARTRATE 50 MG
1 TABLET ORAL
Qty: 30 | Refills: 0 | OUTPATIENT
Start: 2017-06-08 | End: 2017-07-08

## 2017-06-08 RX ADMIN — VALSARTAN 80 MILLIGRAM(S): 80 TABLET ORAL at 05:38

## 2017-06-08 RX ADMIN — APIXABAN 5 MILLIGRAM(S): 2.5 TABLET, FILM COATED ORAL at 17:18

## 2017-06-08 RX ADMIN — Medication 325 MILLIGRAM(S): at 11:31

## 2017-06-08 RX ADMIN — Medication 20 MILLIEQUIVALENT(S): at 11:31

## 2017-06-08 RX ADMIN — APIXABAN 5 MILLIGRAM(S): 2.5 TABLET, FILM COATED ORAL at 05:38

## 2017-06-08 RX ADMIN — CLOPIDOGREL BISULFATE 75 MILLIGRAM(S): 75 TABLET, FILM COATED ORAL at 11:31

## 2017-06-08 NOTE — PROGRESS NOTE ADULT - ATTENDING COMMENTS
Restart beta blockers as tolerated. Avoid further antiarrhythmics. Consider followup of LV function, ICD implantation, and consideration of further AF management strategies as described. Can followup as outpatient as needed.

## 2017-06-08 NOTE — PROGRESS NOTE ADULT - SUBJECTIVE AND OBJECTIVE BOX
Follow up of PAF, bradycardia and long QT secondary to amiodarone    No complaints this am, TTE completed, results pending.  QTc improved today= 557msec. +PAF overnight/3hrs 130-140bpm      EKG: SB 52bpm, QT 600msec/[id number]  TELE: SB 50's, PAF ~3 hours 1-4am, 2 episodes of WCT 7 and 5 beats ?aberrancy vs NSVT    MEDICATIONS  (STANDING):  apixaban 5milliGRAM(s) Oral two times a day  atorvastatin 40milliGRAM(s) Oral at bedtime  clopidogrel Tablet 75milliGRAM(s) Oral daily  aspirin 325milliGRAM(s) Oral daily  valsartan 80milliGRAM(s) Oral daily  potassium chloride    Tablet ER 20milliEquivalent(s) Oral once    Allergies  shellfish (Hives)  sulfa drugs (Rash (Unknown))  Intolerances    PAST MEDICAL & SURGICAL HISTORY:  RHETT treated with BiPAP  Uncomplicated asthma, unspecified asthma severity  Pure hypercholesterolemia  Coronary artery disease involving native coronary artery of native heart, angina presence unspecified  HTN (hypertension): non compliant with meds  S/P tonsillectomy  History of umbilical hernia repair    Vital Signs Last 24 Hrs  T(C): 36.6, Max: 37.1 (06-07 @ 15:00)  T(F): 97.8, Max: 98.7 (06-07 @ 15:00)  HR: 57 (46 - 57)  BP: 148/84 (142/80 - 158/80)  RR: 16 (14 - 16)  SpO2: 97% (95% - 97%)    Physical Exam:  Constitutional: NAD, AAOx3, obese  Cardiovascular: +S1S2 RRR  Pulmonary: CTA b/l, unlabored  GI: soft NTND +BS  Extremities: no pedal edema, +distal pulses b/l  Neuro: non focal, CASANOVA x4    LABS:                        16.0   7.6   )-----------( 87       ( 08 Jun 2017 05:16 )             46.7     06-08    146<H>  |  104  |  17.0  ----------------------------<  94  3.6   |  28.0  |  0.96    Ca    9.2      08 Jun 2017 05:16  Phos  3.6     06-07  Mg     2.3     06-08    TPro  6.2<L>  /  Alb  3.8  /  TBili  0.9  /  DBili  x   /  AST  14  /  ALT  16  /  AlkPhos  90  06-07  PT/INR - ( 07 Jun 2017 04:54 )   PT: 14.6 sec;   INR: 1.32 ratio       PTT - ( 07 Jun 2017 04:54 )  PTT:33.1 sec    TTE: pending        A/P: A/P: 55 yo obese male with pmhx HTN, HLD, CAD s/p PCI to LAD x 2 12/2016 ([facility]), RHETT on CPAP, new cardiomyopathy EF 30% dx 12/2016 at time of PCI, PAF on eliquis with recent reload of po amiodarone and up titration of metoprolol admitted with bradycardia and prolonged QT. Though QT prolongation usually is benign when due to amiodarone, would avoid further amiodarone and other QT prolonging medications given markedly prolonged QT interval at this time. QT improved today. TTE results pending to further risk stratify for primary prevention ICD.  If cardiomyopathy thought to be related to AF and tachycardia, he will require optimal control of AF (or heart rates at least) to improve cardiomyopathy. He has limited good antiarrhythmic options given his cardiomyopathy, QT prolongation, and failure of amiodarone, and thus may be good candidate for catheter ablation. If this is not possible, at very least would ensure HRs < 100 bpm during AF.     1.PAF- continue eliquis, continue CPAP           -restart metoprolol when HR will tolerate    2.CM-TTE results pending          -continue valsartan, restart metoprolol as above    3.QT prolongation-improving         -daily ECG, monitor lytes           -avoid qt prolonging meds    4. CAD- continue plavix, consider decreasing asa to 81 as on DAPT and NOAC  will dw Dr Meyer

## 2017-06-08 NOTE — DISCHARGE NOTE ADULT - HOSPITAL COURSE
This is a 57 y/o male sent by his cardiologist for bradycardia. Two weeks ago his Amiodarone was increased from 200mg to 800mg and Metoprolol increased from 50mg to 100mg daily due to uncontrolled A.fib.  He denies: Dizziness, Chest pain, palpitations, nausea, vomiting. Patient has history of Severe RHETT but is non-complaint with BIPAP throughout the night.   On medication reconciliation patient is found to be taking both Lisinopril 20mg and Valsartan 80mg.  Pt indicates that his blood pressure was uncontrolled on Lisinopril 20mg at which time valsartan was added but he does not recall being told to stop lisinopril so he has been taking both since approx January. (06 G[address]     Patient bradycardia down in the 40s, largely nonsymptomatic. Monitored in beds, Amiodarone and beta blockers helds.  Heart rate came up to 66.  Continued on apixaban.  Cardiology evaluated.   Determined to -    Restart BB on d/c  Continue other current CV meds at current doses  Echo, likely will wait on AICD to allow more time with CPAP and out of AF to assess EF   F/u EP recs  ?d/c later today    Patient is improved and optimized for discharge. To follow up with cardiology as outpatient.

## 2017-06-08 NOTE — DISCHARGE NOTE ADULT - PLAN OF CARE
compliance with follow up and cardiac medication Follow up with cardiology dr Stevens in 5-10 days   Cardiac diet   Up ad dylon

## 2017-06-08 NOTE — DISCHARGE NOTE ADULT - MEDICATION SUMMARY - MEDICATIONS TO TAKE
I will START or STAY ON the medications listed below when I get home from the hospital:    .  -- Home Nebulizer Machine  Dx: Asthma  #1  -- Indication: For RHETT treated with BiPAP    aspirin 325 mg oral tablet  -- 1 tab(s) by mouth once a day  -- Indication: For CAD     valsartan 80 mg oral capsule  -- 1 tab(s) by mouth once a day  -- Indication: For Hypertension     apixaban 5 mg oral tablet  -- 1 tab(s) by mouth 2 times a day  -- Indication: For afib    atorvastatin 40 mg oral tablet  -- 1 tab(s) by mouth once a day (at bedtime)  -- Indication: For dyslipidemia    clopidogrel 75 mg oral tablet  -- 1 tab(s) by mouth once a day  -- Indication: For CAD    Toprol-XL 50 mg oral tablet, extended release  -- 1 tab(s) by mouth once a day  -- Indication: For afib

## 2017-06-08 NOTE — DISCHARGE NOTE ADULT - CARE PROVIDER_API CALL
Shaan Perez (MD), Cardiovascular Disease  [address] [place] [address]  Phone: [phone]  Fax: [phone]    CHITRA Jay  Phone: (   )    -  Fax: (   )    -

## 2017-06-08 NOTE — DISCHARGE NOTE ADULT - CARE PLAN
Principal Discharge DX:	Permanent atrial fibrillation  Goal:	compliance with follow up and cardiac medication  Instructions for follow-up, activity and diet:	Follow up with cardiology dr Stevens in 5-10 days   Cardiac diet   Up ad dylon  Secondary Diagnosis:	Coronary artery disease involving native coronary artery of native heart, angina presence unspecified  Secondary Diagnosis:	Essential hypertension  Secondary Diagnosis:	RHETT treated with BiPAP

## 2017-06-08 NOTE — PROGRESS NOTE ADULT - SUBJECTIVE AND OBJECTIVE BOX
MARI DUNIA  [id number]      Chief Complaint:  Alonso/LQT    Interval History:  Patient with no c/o.  No CP/SOB/palps.    Tele:  1 brief NSVT, SR, rates higher      apixaban 5milliGRAM(s) Oral two times a day  atorvastatin 40milliGRAM(s) Oral at bedtime  clopidogrel Tablet 75milliGRAM(s) Oral daily  aspirin 325milliGRAM(s) Oral daily  valsartan 80milliGRAM(s) Oral daily          Physical Exam:  T(C): 36.6, Max: 37.1 (06-07 @ 15:00)  HR: 57 (46 - 57)  BP: 148/84 (142/68 - 158/80)  RR: 16 (14 - 16)  SpO2: 97% (95% - 97%)  Wt(kg): --  General: Patient comfortable in NAD  HEENT: NCAT, mmm, EOMI  Neck: no JVD, no carotid bruits  CVS: nl s1, split s2, no s3, +s4, +sys murmur, regular, alonso  Chest: CTA b/l  Abdomen: soft, nt/nd  Extremities: No c/c/e  Neuro: A&O x3  Psych: Normal affect        Labs:   08 Jun 2017 05:16    146    |  104    |  17.0   ----------------------------<  94     3.6     |  28.0   |  0.96     Ca    9.2        08 Jun 2017 05:16  Phos  3.6       07 Jun 2017 04:54  Mg     2.3       08 Jun 2017 05:16    TPro  6.2    /  Alb  3.8    /  TBili  0.9    /  DBili  x      /  AST  14     /  ALT  16     /  AlkPhos  90     07 Jun 2017 04:54                          16.0   7.6   )-----------( 87       ( 08 Jun 2017 05:16 )             46.7     PT/INR - ( 07 Jun 2017 04:54 )   PT: 14.6 sec;   INR: 1.32 ratio         PTT - ( 07 Jun 2017 04:54 )  PTT:33.1 sec      EKG:  SR with no ST-T changes, QTc 480ms        Assessment:  55 y/o male PAF on Amio, HTN, NICM, CAD p/w bradycardia and LQT noted in our office.  Two weeks ago his Amiodarone was increased from 200mg QD to 400mg BID to reload due to uncontrolled AF and metoprolol increased from 50mg to 100mg daily as well.  No symptoms.  Tele with SR with no HB, alonso to 40s, better today with one brief asymptomatic NSVT.  QTc much better today.    Plan:  1. Tele  2. Hold Amio  3. Restart BB on d/c  4. Continue other current CV meds at current doses  5. Daily EKG  6. Echo, likely will wait on AICD to allow more time with CPAP and out of AF to assess EF  7. F/u EP recs  8. ?d/c later today

## 2017-06-08 NOTE — DISCHARGE NOTE ADULT - MEDICATION SUMMARY - MEDICATIONS TO STOP TAKING
I will STOP taking the medications listed below when I get home from the hospital:    lisinopril 20 mg oral tablet  -- 1 tab(s) by mouth once a day    amiodarone 200 mg oral tablet  -- 4 tab(s) by mouth once a day

## 2017-06-08 NOTE — DISCHARGE NOTE ADULT - SECONDARY DIAGNOSIS.
Coronary artery disease involving native coronary artery of native heart, angina presence unspecified Essential hypertension RHETT treated with BiPAP

## 2018-03-16 ENCOUNTER — RX RENEWAL (OUTPATIENT)
Age: 58
End: 2018-03-16

## 2018-06-08 ENCOUNTER — RX RENEWAL (OUTPATIENT)
Age: 58
End: 2018-06-08

## 2018-10-15 ENCOUNTER — RX RENEWAL (OUTPATIENT)
Age: 58
End: 2018-10-15

## 2018-10-16 ENCOUNTER — RX RENEWAL (OUTPATIENT)
Age: 58
End: 2018-10-16

## 2018-10-18 ENCOUNTER — RX RENEWAL (OUTPATIENT)
Age: 58
End: 2018-10-18

## 2018-10-23 PROBLEM — G47.33 OBSTRUCTIVE SLEEP APNEA (ADULT) (PEDIATRIC): Chronic | Status: ACTIVE | Noted: 2017-06-07

## 2018-10-31 ENCOUNTER — APPOINTMENT (OUTPATIENT)
Dept: DERMATOLOGY | Facility: CLINIC | Age: 58
End: 2018-10-31
Payer: COMMERCIAL

## 2018-10-31 VITALS — HEIGHT: 71 IN | BODY MASS INDEX: 32.2 KG/M2 | WEIGHT: 230 LBS

## 2018-10-31 DIAGNOSIS — Z86.79 PERSONAL HISTORY OF OTHER DISEASES OF THE CIRCULATORY SYSTEM: ICD-10-CM

## 2018-10-31 DIAGNOSIS — L73.8 OTHER SPECIFIED FOLLICULAR DISORDERS: ICD-10-CM

## 2018-10-31 DIAGNOSIS — Z86.69 PERSONAL HISTORY OF OTHER DISEASES OF THE NERVOUS SYSTEM AND SENSE ORGANS: ICD-10-CM

## 2018-10-31 DIAGNOSIS — Z78.9 OTHER SPECIFIED HEALTH STATUS: ICD-10-CM

## 2018-10-31 DIAGNOSIS — D22.5 MELANOCYTIC NEVI OF TRUNK: ICD-10-CM

## 2018-10-31 DIAGNOSIS — L82.1 OTHER SEBORRHEIC KERATOSIS: ICD-10-CM

## 2018-10-31 PROCEDURE — 99203 OFFICE O/P NEW LOW 30 MIN: CPT

## 2018-11-26 ENCOUNTER — APPOINTMENT (OUTPATIENT)
Dept: DERMATOLOGY | Facility: CLINIC | Age: 58
End: 2018-11-26
Payer: COMMERCIAL

## 2018-11-26 PROCEDURE — 99213 OFFICE O/P EST LOW 20 MIN: CPT

## 2018-12-21 ENCOUNTER — RX RENEWAL (OUTPATIENT)
Age: 58
End: 2018-12-21

## 2019-01-09 ENCOUNTER — CLINICAL ADVICE (OUTPATIENT)
Age: 59
End: 2019-01-09

## 2019-01-09 ENCOUNTER — RECORD ABSTRACTING (OUTPATIENT)
Age: 59
End: 2019-01-09

## 2019-01-09 DIAGNOSIS — Z87.898 PERSONAL HISTORY OF OTHER SPECIFIED CONDITIONS: ICD-10-CM

## 2019-01-09 RX ORDER — LISINOPRIL 20 MG/1
20 TABLET ORAL
Refills: 0 | Status: DISCONTINUED | COMMUNITY
End: 2019-01-09

## 2019-02-07 ENCOUNTER — NON-APPOINTMENT (OUTPATIENT)
Age: 59
End: 2019-02-07

## 2019-02-07 ENCOUNTER — APPOINTMENT (OUTPATIENT)
Dept: CARDIOLOGY | Facility: CLINIC | Age: 59
End: 2019-02-07
Payer: COMMERCIAL

## 2019-02-07 ENCOUNTER — INPATIENT (INPATIENT)
Facility: HOSPITAL | Age: 59
LOS: 1 days | Discharge: HOME | DRG: 309 | End: 2019-02-09
Attending: INTERNAL MEDICINE | Admitting: HOSPITALIST
Payer: COMMERCIAL

## 2019-02-07 VITALS
SYSTOLIC BLOOD PRESSURE: 158 MMHG | WEIGHT: 230 LBS | RESPIRATION RATE: 16 BRPM | BODY MASS INDEX: 32.2 KG/M2 | HEART RATE: 146 BPM | HEIGHT: 71 IN | DIASTOLIC BLOOD PRESSURE: 100 MMHG

## 2019-02-07 VITALS
SYSTOLIC BLOOD PRESSURE: 163 MMHG | HEART RATE: 135 BPM | RESPIRATION RATE: 18 BRPM | HEIGHT: 71 IN | TEMPERATURE: 98 F | DIASTOLIC BLOOD PRESSURE: 127 MMHG | OXYGEN SATURATION: 98 % | WEIGHT: 229.94 LBS

## 2019-02-07 DIAGNOSIS — Z90.89 ACQUIRED ABSENCE OF OTHER ORGANS: Chronic | ICD-10-CM

## 2019-02-07 DIAGNOSIS — Z98.890 OTHER SPECIFIED POSTPROCEDURAL STATES: Chronic | ICD-10-CM

## 2019-02-07 PROCEDURE — 99245 OFF/OP CONSLTJ NEW/EST HI 55: CPT

## 2019-02-07 PROCEDURE — 71045 X-RAY EXAM CHEST 1 VIEW: CPT | Mod: 26

## 2019-02-07 PROCEDURE — 93000 ELECTROCARDIOGRAM COMPLETE: CPT

## 2019-02-07 PROCEDURE — 99291 CRITICAL CARE FIRST HOUR: CPT

## 2019-02-07 RX ORDER — DILTIAZEM HCL 120 MG
5 CAPSULE, EXT RELEASE 24 HR ORAL
Qty: 125 | Refills: 0 | Status: DISCONTINUED | OUTPATIENT
Start: 2019-02-07 | End: 2019-02-07

## 2019-02-07 RX ORDER — FLUTICASONE FUROATE 200 UG/1
200 POWDER RESPIRATORY (INHALATION) DAILY
Qty: 1 | Refills: 5 | Status: DISCONTINUED | COMMUNITY
Start: 2017-02-15 | End: 2019-02-07

## 2019-02-07 RX ORDER — METOPROLOL SUCCINATE 50 MG/1
50 TABLET, EXTENDED RELEASE ORAL
Refills: 0 | Status: DISCONTINUED | COMMUNITY
End: 2019-02-07

## 2019-02-07 RX ORDER — SODIUM CHLORIDE 9 MG/ML
1000 INJECTION INTRAMUSCULAR; INTRAVENOUS; SUBCUTANEOUS ONCE
Qty: 0 | Refills: 0 | Status: COMPLETED | OUTPATIENT
Start: 2019-02-07 | End: 2019-02-07

## 2019-02-07 RX ORDER — PREDNISONE 10 MG/1
10 TABLET ORAL
Qty: 21 | Refills: 0 | Status: DISCONTINUED | COMMUNITY
Start: 2017-02-15 | End: 2019-02-07

## 2019-02-07 RX ORDER — METOPROLOL TARTRATE 50 MG
5 TABLET ORAL ONCE
Qty: 0 | Refills: 0 | Status: DISCONTINUED | OUTPATIENT
Start: 2019-02-07 | End: 2019-02-07

## 2019-02-07 RX ORDER — ALBUTEROL SULFATE 90 UG/1
108 (90 BASE) AEROSOL, METERED RESPIRATORY (INHALATION)
Qty: 1 | Refills: 5 | Status: DISCONTINUED | COMMUNITY
Start: 2017-02-15 | End: 2019-02-07

## 2019-02-07 RX ORDER — CLOPIDOGREL BISULFATE 75 MG/1
75 TABLET, FILM COATED ORAL
Qty: 90 | Refills: 2 | Status: DISCONTINUED | COMMUNITY
Start: 2018-06-08 | End: 2019-02-07

## 2019-02-07 RX ORDER — VALSARTAN 80 MG/1
1 TABLET ORAL
Qty: 0 | Refills: 0 | COMMUNITY

## 2019-02-07 RX ADMIN — Medication 5 MG/HR: at 23:03

## 2019-02-07 RX ADMIN — Medication 5 MG/HR: at 19:03

## 2019-02-07 RX ADMIN — SODIUM CHLORIDE 1000 MILLILITER(S): 9 INJECTION INTRAMUSCULAR; INTRAVENOUS; SUBCUTANEOUS at 19:40

## 2019-02-07 RX ADMIN — SODIUM CHLORIDE 1000 MILLILITER(S): 9 INJECTION INTRAMUSCULAR; INTRAVENOUS; SUBCUTANEOUS at 18:40

## 2019-02-07 NOTE — ED STATDOCS - PROGRESS NOTE DETAILS
57 y/o M pt with PMHx of Afib and stent presents to ED for heart rate control and possible ablation. Pt reports he saw Dr. Perez today and was referred to ED for further evaluation. He is currently taking Eliquis, Plavix, Amiodarone, Metoprolol, and Atorvastatin). Pt reports occasional heart palpitations, states he can detect it while sitting down. Possible allergy to sulfa.   Pt will be sent to Main ED w/ monitor for further eval  EKG 17:46 - rapid Afib, 146

## 2019-02-07 NOTE — ED PROVIDER NOTE - CRITICAL CARE PROVIDED
documentation/direct patient care (not related to procedure)/additional history taking/consultation with other physicians/interpretation of diagnostic studies/conducted a detailed discussion of DNR status

## 2019-02-07 NOTE — PHARMACOTHERAPY INTERVENTION NOTE - COMMENTS
Completed patient's outpatient medication reconciliation. All medication information obtained from outpatient records. Patient gets some medications from [employer] and the rest from [facility].
Rapid Afib

## 2019-02-07 NOTE — ED PROVIDER NOTE - PHYSICAL EXAMINATION
Gen: NAD, AOx3  Head: NCAT  HEENT: PERRL, EOMI, oral mucosa moist, normal conjunctiva, neck supple  Lung: CTAB, no respiratory distress  CV: tachy irregular, no murmur, Normal perfusion  Abd: soft, NTND  MSK: No edema, no visible deformities  Neuro: No focal neurologic deficits  Skin: No rash   Psych: normal affect

## 2019-02-07 NOTE — ASSESSMENT
[FreeTextEntry1] : Atrial flutter with variable rate and overall heart rate of 146. Diffuse nonspecific T wave flattening.\par \par Impression:\par 1. Recurrent atrial fib flutter of at least 2-3 days duration. Despite amiodarone therapy. Despite beta blocker therapy ventricular response is brisk.\par 2. No hemodynamic optimize her CHF at this point in time. No signs of angina despite a prior history of coronary artery disease and LAD stenting.\par 3. Hypertension that seems to still be suboptimally controlled.\par 4. Obstructive sleep apnea which is severe and not being treated.\par 5. No recent laboratory data available. Patient lost to medical followup for about 15 months.\par \par Plan:\par 1. Admission to the hospital for rate control purposes.\par 2. Assessment with serial troponins for any signs of demand injury.\par 3. EP evaluation for consideration of atrial flutter ablation.\par 4. Resumption of use of CPAP.\par 5. Better controlled hypertension.\par 6. Repeat assessment of left ventricular systolic function, which in the past has been negative we affected by untreated sleep apnea and atrial fibrillation and rapid ventricular rate.

## 2019-02-07 NOTE — ED PROVIDER NOTE - NS ED ROS FT
ROS: no CP/SOB. no cough. no fever. no n/v/d/c. no abd pain. no rash. no bleeding. no urinary complaints. no weakness. no vision changes. no HA. no neck/back pain. no extremity swelling/deformity. No change in mental status. +palpitations

## 2019-02-07 NOTE — ED ADULT NURSE NOTE - OBJECTIVE STATEMENT
Pt sent in by cardiology for rapid Afib. Pt with h/o Afib, has had palpitations for a couple of days.

## 2019-02-07 NOTE — REASON FOR VISIT
[FreeTextEntry1] : Patient presents here for urgent evaluation with regard to complaints of palpitations of several days duration. He feels that this is similar to the atrial arrhythmia he experienced in the past. He cannot identify any obvious provocation.\par Denies any significant shortness of breath or chest pain at this time.\par Has not been using his CPAP for many months despite severe obstructive sleep apnea his understanding of the consequences.\par The patient had a history of a dilated cardiomyopathy with hypertensive heart disease that did improve with treatment of his hypertension and sleep apnea.\par Prior history of paroxysmal atrial fibrillation status post cardioversion.\par On amiodarone therapy, liver, significant prolongation of QT was found when we told him with more amiodarone.

## 2019-02-07 NOTE — ED PROVIDER NOTE - OBJECTIVE STATEMENT
59yo M with palpitations x3 days, h./o a fib, feels like 'a fib'; no CP. no SOB. worse when relaxing/sleeping. no LE edema. no recent illness. palpitations sudden onset. moderate. Saw Dr. Perez in office who sent to ED. on eliquis abnd metoprolol     cards- Chris

## 2019-02-07 NOTE — ED ADULT NURSE REASSESSMENT NOTE - NS ED NURSE REASSESS COMMENT FT1
cardiology and dr. mccann turned off carizem gtt.
Report received from offgoing RN, charting as noted. Patient is A&Ox4, denies any pain or discomfort. Afib in 140s on cardiac monitor, blood pressure stable at 140 systolic.  Notified Dr. Muniz, administered 90mg PO cardizem as ordered. Lungs clear, offers no other complaints at this time. will continue to monitor and reassess as indicated.

## 2019-02-07 NOTE — ED PROVIDER NOTE - PROGRESS NOTE DETAILS
mild response to 25mg/kg and 35mg/kg push. still tachy 120-140, will start cardizem gtt. goal HR <120 -Slowey DO seen by EP Dr. Tenorio, no cardizem gtt, esmolol gtt if HR >140 or symptomatic. okay with BP in 120s. plan for CODY and cardioversion -Flavio DO

## 2019-02-07 NOTE — CONSULT NOTE ADULT - SUBJECTIVE AND OBJECTIVE BOX
Mr. Morelos is a very pleasant 57 yo obese male with a pmhx HTN, HLD, CAD [facility] dilated cardiomyopathy (EF 30%) dx 12/2016 that has improved to normal as of 6/8/17 TTE, RHETT not on CPAP, and pAF on Eliquis (s/p DCCV in the past and intolerant of amiodarone 2/2 bradycardia and prolonged QTc)  who presents from his cardiologist's office for management of atrial flutter with RVR.    Electrophysiology consultation was requested for further recommendations regarding his AF and atrial flutter. Pt states he had been doing well up until about 3 days PTA where he began noticing palpitations. He mentions he's had palpitations "on and off in the past," but that this episode was different "because it never went away." He was able to get in to see Dr. Perez (LICA- his office note documents pt has been lost to f/u for 15 months) who noted on his EKG to be in atrial flutter up to the 140s and sent him directly to the [facility] ER. He was placed on a cardizem gtt and other than palpitations he denies any CP, SOB, orthopnea/PND/LE edema, LH/dizziness/syncope, or n/v/diaphoresis.       PAST MEDICAL & SURGICAL HISTORY:  RHETT treated with BiPAP  Uncomplicated asthma, unspecified asthma severity  Pure hypercholesterolemia  Coronary artery disease involving native coronary artery of native heart, angina presence unspecified  HTN (hypertension): non compliant with meds  S/P tonsillectomy  History of umbilical hernia repair      FAMILY HISTORY:  negative for any inherited arrhythmias, cardiomyopathies, or SCD      SOCIAL HISTORY:  denies tobacco/EtOH/IVDU   with children      REVIEW OF SYSTEMS:  Constitutional- No fever, weight loss, or fatigue  HEENT- No eye pain, visual disturbances, or discharge; no difficulty hearing, tinnitus, vertigo; No sinus or throat pain; no pain or stiffness  Breasts- No pain, masses, or nipple discharge  Pulmonary- No cough, wheezing, chills or hemoptysis; No shortness of breath  Cardiovascular- as per HPI  Gastrointestinal- No abdominal or epigastric pain. No nausea, vomiting, or hematemesis; No diarrhea or constipation. No melena or hematochezia.  Genitourinary- No dysuria, frequency, hematuria, or incontinence  Neurological- No headaches, memory loss, loss of strength, numbness, or tremors  Dermatological- No itching, burning, rashes, or lesions   Lymphatics- No enlarged glands  Endocrine- No heat or cold intolerance; No hair loss  Musculoskeletal- No joint pain or swelling; No muscle, back, or extremity pain  Psychiatric- No depression, anxiety, mood swings, or difficulty sleeping  Hematologic- No easy bruising, or bleeding gums  Allergy & Immunology- No hives or eczema      Allergies  shellfish (Hives)  sulfa drugs (Rash (Unknown))      MEDICATIONS  (STANDING):  diltiazem Infusion 5 mG/Hr (5 mL/Hr) IV Continuous <Continuous>      Vital Signs Last 24 Hrs  T(C): 36.9 (07 Feb 2019 17:37), Max: 36.9 (07 Feb 2019 17:37)  T(F): 98.5 (07 Feb 2019 17:37), Max: 98.5 (07 Feb 2019 17:37)  HR: 127 (07 Feb 2019 21:06) (127 - 156)  BP: 134/71 (07 Feb 2019 21:06) (112/57 - 163/127)  BP(mean): --  RR: 16 (07 Feb 2019 20:04) (16 - 18)  SpO2: 99% (07 Feb 2019 18:42) (98% - 100%)      PHYSICAL EXAM:  Constitutional- AAOx3, NAD, wife and son at bedside, comfortable  Neck: supple, no todd A waves  Cardiovascular: +S1S2 irr RR, tachy, no obvious murmurs, rubs, or gallops   Pulmonary: CTA b/l, unlabored, no wheezes, rales, or rhonchi  Abdomen: obese, soft, NTND  Extremities: no edema b/l  Neuro: non focal, speech clear, CASANOVA x 4  Psych: appropriate mood & affect      LABS:                        14.8   8.6   )-----------( 101      ( 07 Feb 2019 19:12 )             43.8     02-07    142  |  104  |  26.0<H>  ----------------------------<  87  4.1   |  25.0  |  1.38<H>    Ca    9.4      07 Feb 2019 19:36  Mg     1.9     02-07    TPro  6.9  /  Alb  3.8  /  TBili  1.4  /  DBili  x   /  AST  17  /  ALT  15  /  AlkPhos  111  02-07  PT/INR - ( 07 Feb 2019 19:12 )   PT: 19.7 sec;   INR: 1.69 ratio    PTT - ( 07 Feb 2019 19:12 )  PTT:33.6 sec  CARDIAC MARKERS ( 07 Feb 2019 19:36 )  x     / <0.01 ng/mL / x     / x     / x          CARDIAC IMAGING/TESTING:  TTE 6/8/17 Summary:   1. Left ventricular ejection fraction, by visual estimation, is 60 to 65%.   2. Normal global left ventricular systolic function.   3. There is mild concentric left ventricular hypertrophy.   4. Normal left ventricular internal cavity size.   5. Normal right ventricular size and function.   6. The left atrium is normal in size.   7. The right atrium is normal in size.   8. Moderate mitral annular calcification.   9. Thickening of the anterior and posterior mitral valve leaflets.  10. Trace mitral valve regurgitation.  11. Normal trileaflet aortic valve with normal opening.  12. There is no evidence of pericardial effusion.    Cardiac Cath 12/19/16:  VENTRICLES: No LV gram was performed; however, a recent echocardiogram  demonstrated an EF of 30 %.  CORONARY VESSELS: The coronary circulation is right dominant.  LM:   --  LM: Normal.  LAD:   --  Proximal LAD: There was a 70 % stenosis.  --  Mid LAD: There was a 80 % stenosis in-stent.  CX:   --  Ostial circumflex: There was a 50 % stenosis. FFR 0.94.  RCA:   --  RCA: Normal.  COMPLICATIONS: No complications occurred during the cath lab visit.  DIAGNOSTIC IMPRESSIONS: Severe proximal and mid LAD disease. PCI performed with 2 CHRISTEL.  DIAGNOSTIC RECOMMENDATIONS: Aspirin and Plavix.      EP DATA:  EKG on admission reveals likely atrial flutter (discrete flutter waves observed in V1 and possibly lead II), however AF cannot be excluded, with RVR to 144 bpm

## 2019-02-07 NOTE — PHYSICAL EXAM
[FreeTextEntry1] :                    Well appearing and nourished with no obvious deformities or distress.\par \par Eyes: \par No conjunctival injection and no xanthelasmas.\par HEENT: \par Normocephalic.Normal oral mucosa. No pallor or cyanosis\par Neck: \par No jugular venous distension. with normal A and V wave forms. No palpable adenopathy.\par Cardiovascular: \par Rapid irregular S1, S2 and a grade 1/6 systolic murmur. Distal arterial pulses are normal. No significant peripheral edema.\par Pulmonary: \par Lungs are clear to auscultation and percussion. Normal respiratory pattern without any accessory muscle use\par Abdomen: \par Soft, obese,non-tender ; no palpable organomegaly or masses.\par Extremities:\par No digital clubbing, cyanosis or ischemic changes.\par Skin: \par No skin lesions, rashes, ulcers or xanthomas.\par Psychiatric: \par Alert and oriented to person, place and time. Appropriate mood and affect.

## 2019-02-08 DIAGNOSIS — I48.92 UNSPECIFIED ATRIAL FLUTTER: ICD-10-CM

## 2019-02-08 LAB
ANION GAP SERPL CALC-SCNC: 14 MMOL/L — SIGNIFICANT CHANGE UP (ref 5–17)
BASOPHILS # BLD AUTO: 0 K/UL — SIGNIFICANT CHANGE UP (ref 0–0.2)
BASOPHILS NFR BLD AUTO: 0.7 % — SIGNIFICANT CHANGE UP (ref 0–2)
BUN SERPL-MCNC: 20 MG/DL — SIGNIFICANT CHANGE UP (ref 8–20)
CALCIUM SERPL-MCNC: 8.5 MG/DL — LOW (ref 8.6–10.2)
CHLORIDE SERPL-SCNC: 105 MMOL/L — SIGNIFICANT CHANGE UP (ref 98–107)
CO2 SERPL-SCNC: 24 MMOL/L — SIGNIFICANT CHANGE UP (ref 22–29)
CREAT SERPL-MCNC: 1.05 MG/DL — SIGNIFICANT CHANGE UP (ref 0.5–1.3)
EOSINOPHIL # BLD AUTO: 0.1 K/UL — SIGNIFICANT CHANGE UP (ref 0–0.5)
EOSINOPHIL NFR BLD AUTO: 1.7 % — SIGNIFICANT CHANGE UP (ref 0–6)
GLUCOSE SERPL-MCNC: 90 MG/DL — SIGNIFICANT CHANGE UP (ref 70–115)
HCT VFR BLD CALC: 41.3 % — LOW (ref 42–52)
HGB BLD-MCNC: 13.8 G/DL — LOW (ref 14–18)
LYMPHOCYTES # BLD AUTO: 1 K/UL — SIGNIFICANT CHANGE UP (ref 1–4.8)
LYMPHOCYTES # BLD AUTO: 14.7 % — LOW (ref 20–55)
MCHC RBC-ENTMCNC: 30.8 PG — SIGNIFICANT CHANGE UP (ref 27–31)
MCHC RBC-ENTMCNC: 33.4 G/DL — SIGNIFICANT CHANGE UP (ref 32–36)
MCV RBC AUTO: 92.2 FL — SIGNIFICANT CHANGE UP (ref 80–94)
MONOCYTES # BLD AUTO: 1 K/UL — HIGH (ref 0–0.8)
MONOCYTES NFR BLD AUTO: 13.4 % — HIGH (ref 3–10)
NEUTROPHILS # BLD AUTO: 5 K/UL — SIGNIFICANT CHANGE UP (ref 1.8–8)
NEUTROPHILS NFR BLD AUTO: 69.2 % — SIGNIFICANT CHANGE UP (ref 37–73)
PLATELET # BLD AUTO: 86 K/UL — LOW (ref 150–400)
POTASSIUM SERPL-MCNC: 3.6 MMOL/L — SIGNIFICANT CHANGE UP (ref 3.5–5.3)
POTASSIUM SERPL-SCNC: 3.6 MMOL/L — SIGNIFICANT CHANGE UP (ref 3.5–5.3)
RBC # BLD: 4.48 M/UL — LOW (ref 4.6–6.2)
RBC # FLD: 12.5 % — SIGNIFICANT CHANGE UP (ref 11–15.6)
SODIUM SERPL-SCNC: 143 MMOL/L — SIGNIFICANT CHANGE UP (ref 135–145)
T3 SERPL-MCNC: 117 NG/DL — SIGNIFICANT CHANGE UP (ref 80–200)
T4 AB SER-ACNC: 12 UG/DL — SIGNIFICANT CHANGE UP (ref 4.5–12)
WBC # BLD: 7.2 K/UL — SIGNIFICANT CHANGE UP (ref 4.8–10.8)
WBC # FLD AUTO: 7.2 K/UL — SIGNIFICANT CHANGE UP (ref 4.8–10.8)

## 2019-02-08 PROCEDURE — 92960 CARDIOVERSION ELECTRIC EXT: CPT

## 2019-02-08 PROCEDURE — 99223 1ST HOSP IP/OBS HIGH 75: CPT

## 2019-02-08 PROCEDURE — 93312 ECHO TRANSESOPHAGEAL: CPT | Mod: 26

## 2019-02-08 PROCEDURE — 93325 DOPPLER ECHO COLOR FLOW MAPG: CPT | Mod: 26

## 2019-02-08 PROCEDURE — 76376 3D RENDER W/INTRP POSTPROCES: CPT | Mod: 26

## 2019-02-08 PROCEDURE — 93320 DOPPLER ECHO COMPLETE: CPT | Mod: 26

## 2019-02-08 PROCEDURE — 12345: CPT | Mod: NC

## 2019-02-08 RX ORDER — ASPIRIN/CALCIUM CARB/MAGNESIUM 324 MG
325 TABLET ORAL DAILY
Qty: 0 | Refills: 0 | Status: DISCONTINUED | OUTPATIENT
Start: 2019-02-08 | End: 2019-02-09

## 2019-02-08 RX ORDER — AMIODARONE HYDROCHLORIDE 400 MG/1
200 TABLET ORAL DAILY
Qty: 0 | Refills: 0 | Status: DISCONTINUED | OUTPATIENT
Start: 2019-02-08 | End: 2019-02-09

## 2019-02-08 RX ORDER — APIXABAN 2.5 MG/1
5 TABLET, FILM COATED ORAL EVERY 12 HOURS
Qty: 0 | Refills: 0 | Status: DISCONTINUED | OUTPATIENT
Start: 2019-02-08 | End: 2019-02-09

## 2019-02-08 RX ORDER — POTASSIUM CHLORIDE 20 MEQ
10 PACKET (EA) ORAL ONCE
Qty: 0 | Refills: 0 | Status: COMPLETED | OUTPATIENT
Start: 2019-02-08 | End: 2019-02-08

## 2019-02-08 RX ORDER — CLOPIDOGREL BISULFATE 75 MG/1
75 TABLET, FILM COATED ORAL DAILY
Qty: 0 | Refills: 0 | Status: DISCONTINUED | OUTPATIENT
Start: 2019-02-08 | End: 2019-02-09

## 2019-02-08 RX ORDER — POTASSIUM CHLORIDE 20 MEQ
40 PACKET (EA) ORAL ONCE
Qty: 0 | Refills: 0 | Status: COMPLETED | OUTPATIENT
Start: 2019-02-08 | End: 2019-02-08

## 2019-02-08 RX ORDER — METOPROLOL TARTRATE 50 MG
50 TABLET ORAL DAILY
Qty: 0 | Refills: 0 | Status: DISCONTINUED | OUTPATIENT
Start: 2019-02-08 | End: 2019-02-09

## 2019-02-08 RX ORDER — ATORVASTATIN CALCIUM 80 MG/1
40 TABLET, FILM COATED ORAL AT BEDTIME
Qty: 0 | Refills: 0 | Status: DISCONTINUED | OUTPATIENT
Start: 2019-02-08 | End: 2019-02-09

## 2019-02-08 RX ORDER — LISINOPRIL 2.5 MG/1
20 TABLET ORAL DAILY
Qty: 0 | Refills: 0 | Status: DISCONTINUED | OUTPATIENT
Start: 2019-02-08 | End: 2019-02-09

## 2019-02-08 RX ORDER — POTASSIUM CHLORIDE 20 MEQ
20 PACKET (EA) ORAL ONCE
Qty: 0 | Refills: 0 | Status: DISCONTINUED | OUTPATIENT
Start: 2019-02-08 | End: 2019-02-08

## 2019-02-08 RX ADMIN — AMIODARONE HYDROCHLORIDE 200 MILLIGRAM(S): 400 TABLET ORAL at 07:14

## 2019-02-08 RX ADMIN — APIXABAN 5 MILLIGRAM(S): 2.5 TABLET, FILM COATED ORAL at 07:15

## 2019-02-08 RX ADMIN — APIXABAN 5 MILLIGRAM(S): 2.5 TABLET, FILM COATED ORAL at 17:41

## 2019-02-08 RX ADMIN — Medication 100 MILLIEQUIVALENT(S): at 11:48

## 2019-02-08 RX ADMIN — Medication 325 MILLIGRAM(S): at 14:28

## 2019-02-08 RX ADMIN — LISINOPRIL 20 MILLIGRAM(S): 2.5 TABLET ORAL at 07:23

## 2019-02-08 RX ADMIN — CLOPIDOGREL BISULFATE 75 MILLIGRAM(S): 75 TABLET, FILM COATED ORAL at 14:28

## 2019-02-08 RX ADMIN — Medication 100 MILLIEQUIVALENT(S): at 12:58

## 2019-02-08 RX ADMIN — Medication 40 MILLIEQUIVALENT(S): at 17:41

## 2019-02-08 RX ADMIN — ATORVASTATIN CALCIUM 40 MILLIGRAM(S): 80 TABLET, FILM COATED ORAL at 21:02

## 2019-02-08 RX ADMIN — Medication 50 MILLIGRAM(S): at 07:14

## 2019-02-08 NOTE — H&P ADULT - HISTORY OF PRESENT ILLNESS
Pt is a 59 yo M sent to [facility] ED due to palpitations 2/2 A flutter w/ RVR. PMH HLD, CAD s/p PCI to LAD x2, Dilated Cardiomyopathy, HTN, RHETT not on CPAP, pAF on Eliquis s/p DCCV in the past.   Patient was being seen by his cardiologist due to palpitations that have been occurring intermittently, but for the past 3 days they have been more persistent. He denies any associated SOB, chest pain/pressure, light-headedness, dizziness. He has been compliant with all his medications including his amiodarone, metoprolol and eliquis. He has no other complaints.   He has no other complaints. Denies headaches, nausea, vomiting, chest pain, SOB, orthopnea, PND, abdominal pain, constipation, diarrhea, melena, hematochezia, dysuria.   In ED patient was seen by Electrophysiology, cardizem gtt was discontinued. His feeling of palpitations have resolved.

## 2019-02-08 NOTE — H&P ADULT - NSHPPHYSICALEXAM_GEN_ALL_CORE
T(C): 36.8 (02-08-19 @ 00:43), Max: 36.9 (02-07-19 @ 17:37)  HR: 127 (02-08-19 @ 00:43) (118 - 156)  BP: 130/96 (02-08-19 @ 00:43) (112/57 - 163/127)  RR: 17 (02-08-19 @ 00:43) (16 - 18)  SpO2: 98% (02-08-19 @ 00:43) (98% - 100%)  Wt(kg): --    Physical Exam:   GENERAL: well-groomed, well-developed, NAD  HEENT: head NC/AT; EOM intact, PERRLA, conjunctiva & sclera clear; hearing grossly intact, moist mucous membranes  NECK: supple, no JVD  RESPIRATORY: CTA B/L, no wheezing, rales, rhonchi or rubs  CARDIOVASCULAR: S1&S2, irreg irreg, no murmurs or gallops  ABDOMEN: soft, non-tender, non-distended, + Bowel sounds x4 quadrants, no guarding, rebound or rigidity  MUSCULOSKELETAL:  no clubbing, cyanosis or edema of all 4 extremities  LYMPH: no cervical lymphadenopathy  VASCULAR: Radial pulses 2+ bilaterally, no varicose veins   SKIN: warm and dry, color normal  NEUROLOGIC: AA&O X3, CN2-12 intact w/ no focal deficits, no sensory loss, motor Strength 5/5 in UE & LE B/L  Psych: Normal mood and affect, normal behavior

## 2019-02-08 NOTE — PROGRESS NOTE ADULT - SUBJECTIVE AND OBJECTIVE BOX
MARI DUNIA  [id number]        Chief Complaint: follow up rapid atrial flutter       Subjective: no cp/sob/palps      24 hour Tele: atrial flutter, rapid at times        amiodarone    Tablet 200 milliGRAM(s) Oral daily  apixaban 5 milliGRAM(s) Oral every 12 hours  aspirin 325 milliGRAM(s) Oral daily  atorvastatin 40 milliGRAM(s) Oral at bedtime  clopidogrel Tablet 75 milliGRAM(s) Oral daily  lisinopril 20 milliGRAM(s) Oral daily  metoprolol succinate ER 50 milliGRAM(s) Oral daily          Physical Exam:  T(C): 36.9 (02-08-19 @ 07:59), Max: 36.9 (02-07-19 @ 17:37)  HR: 102 (02-08-19 @ 07:59) (100 - 156)  BP: 148/95 (02-08-19 @ 07:59) (112/57 - 163/127)  RR: 17 (02-08-19 @ 07:59) (16 - 18)  SpO2: 95% (02-08-19 @ 07:59) (95% - 100%)  Wt(kg): --  General: Comfortable in NAD  HEENT: MMM, sclera anicteric  Resp: CTA bilaterally  CVS: nl s1s2, irregularly irregular, no s3/JVD  Abd: soft, NT, ND, BS+, obese  Ext: No c/c/e  Neuro A&O x3  Psych: Normal affect    I&O's Summary        Labs:   08 Feb 2019 08:27    143    |  105    |  20.0   ----------------------------<  90     3.6     |  24.0   |  1.05     Ca    8.5        08 Feb 2019 08:27  Mg     1.9       07 Feb 2019 19:36    TPro  6.9    /  Alb  3.8    /  TBili  1.4    /  DBili  x      /  AST  17     /  ALT  15     /  AlkPhos  111    07 Feb 2019 19:36                          13.8   7.2   )-----------( 86       ( 08 Feb 2019 08:27 )             41.3     PT/INR - ( 07 Feb 2019 19:12 )   PT: 19.7 sec;   INR: 1.69 ratio         PTT - ( 07 Feb 2019 19:12 )  PTT:33.6 sec  CARDIAC MARKERS ( 07 Feb 2019 19:36 )  x     / <0.01 ng/mL / x     / x     / x          Assessment:  58yMale PMHX tachy induced myopathy, atrial flutter, HTN, obesity, RHETT here with recurrent sx rapid atrial flutter. Previously had been cardioverted and EF was severe but returned to normal. Current EF unknown. Remains in flutter and needs CODY/DCCV    Plan:  1. Continue A/C with eliquis  2. CODY/DCCV today  3. Continue metoprolol and amiodarone  4. Would monitor overnight post DCCV to ensure maintains SR. Plan for outpatient ablation, appreciate EP assistance by Dr. Jona Couch, Rico TRUJILLO MD

## 2019-02-08 NOTE — PROGRESS NOTE ADULT - SUBJECTIVE AND OBJECTIVE BOX
S/P CODY successful DCCV at 200 joules 1st attempt  Continue eliquis as ordered and beta blocker  D/ DR OSEI

## 2019-02-08 NOTE — PROGRESS NOTE ADULT - SUBJECTIVE AND OBJECTIVE BOX
MARI DUQUE  [id number]      Patient underwent successful CODY/DCCV and now in SR.     Plan:  1. Continue metoprolol. Eliquis and Amiodarone  2. Monitor on tele overnight, likely dc in am  3. TSH low, further blood work pending and may need treatment for hyperthyroidism  4. CPAP for RHETT  5. Has severe CMP,  likely tach mediated and will need to re-evaluate LV function in 1-2 months while back in SR        Rico Couch MD

## 2019-02-08 NOTE — H&P ADULT - ASSESSMENT
Pt is a 59 yo M sent to [facility] ED due to palpitations 2/2 A flutter w/ RVR. PMH HLD, CAD s/p PCI to LAD x2, Dilated Cardiomyopathy, HTN, RHETT not on CPAP, pAF on Eliquis s/p DCCV in the past.     A fib w/ RVR: rate improved to 120's and patient asymptomatic currently.   -seen by Dr. Tenorio of EP.   -cardizem gtt discontinued as recommeded by EP  -plan for CODY/DCCV in AM  -continue Eliquis  -TSH is low, will check T3 and T4 as this may be causing his symptoms.  -monitor on tele    HLD: continue statin    CAD s/p PCI to LAD x2: continue ASA, plavix, Lipitor and metoprolol    Dilated Cardiomyopathy: EF was previous 30% but now improved    RHETT: patient recommended to see pulm for RHETT mgmt    Abnormal TSH: see plan above    DVT ppx: on eliquis    Full code. Pt is a 59 yo M sent to [facility] ED due to palpitations 2/2 A flutter w/ RVR. PMH HLD, CAD s/p PCI to LAD x2, Dilated Cardiomyopathy, HTN, RHETT not on CPAP, pAF on Eliquis s/p DCCV in the past.     A fib w/ RVR: rate improved to 120's and patient asymptomatic currently.   -seen by Dr. Tenorio of EP.   -cardizem gtt discontinued as recommeded by EP  -plan for CODY/DCCV in AM  -continue Eliquis  -TSH is low, will check T3 and T4 as this may be causing his symptoms.  -monitor on tele    HLD: continue statin    CAD s/p PCI to LAD x2: continue ASA, plavix, Lipitor and metoprolol  -patient is on both ACE-i and ARB as an outpatient, at this time will only continue lisinopril. Patient will need to discuss with his cardiologist if he should stay on his current regimen as both ACE-i and ARB is an unusual combination.    Dilated Cardiomyopathy: EF was previous 30% but now improved    RHETT: patient recommended to see pulm for RHETT mgmt    Abnormal TSH: see plan above    DVT ppx: on eliquis    Full code.

## 2019-02-08 NOTE — PROGRESS NOTE ADULT - SUBJECTIVE AND OBJECTIVE BOX
MARI DUQUE  ----------------------------------------  The patient was seen and evaluated for atrial fibrillation.  The patient is in no acute distress.  Denied any chest pain, palpitations, dyspnea, or abdominal pain.  Tolerated cardioversion well.    Vital Signs Last 24 Hrs  T(C): 36.8 (08 Feb 2019 15:20), Max: 36.9 (07 Feb 2019 17:37)  T(F): 98.2 (08 Feb 2019 15:20), Max: 98.5 (07 Feb 2019 17:37)  HR: 53 (08 Feb 2019 15:20) (53 - 156)  BP: 129/85 (08 Feb 2019 15:20) (112/57 - 163/127)  BP(mean): --  RR: 18 (08 Feb 2019 15:20) (16 - 18)  SpO2: 96% (08 Feb 2019 15:20) (95% - 100%)    PHYSICAL EXAMINATION:  ----------------------------------------  General appearance: No acute distress, Awake, Alert  HEENT: Normocephalic, Atraumatic, Conjunctiva clear, EOMI  Neck: Supple, No JVD, No tenderness  Lungs: Breath sound equal bilaterally, No wheezes, No rales  Cardiovascular: S1S2, Regular rhythm  Abdomen: Soft, Nontender, Nondistended, No guarding/rebound, Positive bowel sounds  Extremities: No clubbing, No cyanosis, No edema, No calf tenderness  Neuro: Strength equal bilaterally, No tremors  Psychiatric: Appropriate mood, Normal affect    LABORATORY STUDIES:  ----------------------------------------             13.8   7.2   )-----------( 86       ( 08 Feb 2019 08:27 )             41.3     02-08    143  |  105  |  20.0  ----------------------------<  90  3.6   |  24.0  |  1.05    Ca    8.5<L>      08 Feb 2019 08:27  Mg     1.9     02-07    TPro  6.9  /  Alb  3.8  /  TBili  1.4  /  DBili  x   /  AST  17  /  ALT  15  /  AlkPhos  111  02-07    LIVER FUNCTIONS - ( 07 Feb 2019 19:36 )  Alb: 3.8 g/dL / Pro: 6.9 g/dL / ALK PHOS: 111 U/L / ALT: 15 U/L / AST: 17 U/L / GGT: x           PT/INR - ( 07 Feb 2019 19:12 )   PT: 19.7 sec;   INR: 1.69 ratio    PTT - ( 07 Feb 2019 19:12 )  PTT:33.6 sec    CARDIAC MARKERS ( 07 Feb 2019 19:36 )  x     / <0.01 ng/mL / x     / x     / x        MEDICATIONS  (STANDING):  amiodarone    Tablet 200 milliGRAM(s) Oral daily  apixaban 5 milliGRAM(s) Oral every 12 hours  aspirin 325 milliGRAM(s) Oral daily  atorvastatin 40 milliGRAM(s) Oral at bedtime  clopidogrel Tablet 75 milliGRAM(s) Oral daily  lisinopril 20 milliGRAM(s) Oral daily  metoprolol succinate ER 50 milliGRAM(s) Oral daily  potassium chloride    Tablet ER 40 milliEquivalent(s) Oral once      ASSESSMENT / PLAN:  ----------------------------------------  Atrial fibrillation - Cardiology consultation noted. Status post cardioversion to sinus rhythm. On amiodarone and apixaban. To follow up with Cardiology on an outpatient basis for possible ablation. TSH level was low but T3/4 was within normal limits.    CAD - On aspirin and clopidogrel.    Cardiomyopathy - No evidence of fluid overload. On lisinopril and metoprolol.

## 2019-02-08 NOTE — PROCEDURE NOTE - ADDITIONAL PROCEDURE DETAILS
Pre-procedural anticoagulation confirmed. CODY performed prior confirming absence of LA/AMADA thrombus.

## 2019-02-09 ENCOUNTER — TRANSCRIPTION ENCOUNTER (OUTPATIENT)
Age: 59
End: 2019-02-09

## 2019-02-09 VITALS — SYSTOLIC BLOOD PRESSURE: 140 MMHG | HEART RATE: 58 BPM | DIASTOLIC BLOOD PRESSURE: 88 MMHG

## 2019-02-09 LAB
ANION GAP SERPL CALC-SCNC: 13 MMOL/L — SIGNIFICANT CHANGE UP (ref 5–17)
BUN SERPL-MCNC: 25 MG/DL — HIGH (ref 8–20)
CALCIUM SERPL-MCNC: 8.5 MG/DL — LOW (ref 8.6–10.2)
CHLORIDE SERPL-SCNC: 106 MMOL/L — SIGNIFICANT CHANGE UP (ref 98–107)
CO2 SERPL-SCNC: 22 MMOL/L — SIGNIFICANT CHANGE UP (ref 22–29)
CREAT SERPL-MCNC: 1.25 MG/DL — SIGNIFICANT CHANGE UP (ref 0.5–1.3)
GLUCOSE SERPL-MCNC: 88 MG/DL — SIGNIFICANT CHANGE UP (ref 70–115)
HCV AB S/CO SERPL IA: 0.1 S/CO — SIGNIFICANT CHANGE UP
HCV AB SERPL-IMP: SIGNIFICANT CHANGE UP
MAGNESIUM SERPL-MCNC: 2 MG/DL — SIGNIFICANT CHANGE UP (ref 1.6–2.6)
PHOSPHATE SERPL-MCNC: 3.4 MG/DL — SIGNIFICANT CHANGE UP (ref 2.4–4.7)
POTASSIUM SERPL-MCNC: 4 MMOL/L — SIGNIFICANT CHANGE UP (ref 3.5–5.3)
POTASSIUM SERPL-SCNC: 4 MMOL/L — SIGNIFICANT CHANGE UP (ref 3.5–5.3)
SODIUM SERPL-SCNC: 141 MMOL/L — SIGNIFICANT CHANGE UP (ref 135–145)

## 2019-02-09 PROCEDURE — 99232 SBSQ HOSP IP/OBS MODERATE 35: CPT

## 2019-02-09 PROCEDURE — 85027 COMPLETE CBC AUTOMATED: CPT

## 2019-02-09 PROCEDURE — 85730 THROMBOPLASTIN TIME PARTIAL: CPT

## 2019-02-09 PROCEDURE — 84436 ASSAY OF TOTAL THYROXINE: CPT

## 2019-02-09 PROCEDURE — 80048 BASIC METABOLIC PNL TOTAL CA: CPT

## 2019-02-09 PROCEDURE — 93005 ELECTROCARDIOGRAM TRACING: CPT

## 2019-02-09 PROCEDURE — 96375 TX/PRO/DX INJ NEW DRUG ADDON: CPT

## 2019-02-09 PROCEDURE — 80053 COMPREHEN METABOLIC PANEL: CPT

## 2019-02-09 PROCEDURE — 93312 ECHO TRANSESOPHAGEAL: CPT

## 2019-02-09 PROCEDURE — 96365 THER/PROPH/DIAG IV INF INIT: CPT

## 2019-02-09 PROCEDURE — 83880 ASSAY OF NATRIURETIC PEPTIDE: CPT

## 2019-02-09 PROCEDURE — 93325 DOPPLER ECHO COLOR FLOW MAPG: CPT

## 2019-02-09 PROCEDURE — 84480 ASSAY TRIIODOTHYRONINE (T3): CPT

## 2019-02-09 PROCEDURE — 99239 HOSP IP/OBS DSCHRG MGMT >30: CPT

## 2019-02-09 PROCEDURE — 83735 ASSAY OF MAGNESIUM: CPT

## 2019-02-09 PROCEDURE — 86803 HEPATITIS C AB TEST: CPT

## 2019-02-09 PROCEDURE — 84100 ASSAY OF PHOSPHORUS: CPT

## 2019-02-09 PROCEDURE — 85610 PROTHROMBIN TIME: CPT

## 2019-02-09 PROCEDURE — 99291 CRITICAL CARE FIRST HOUR: CPT | Mod: 25

## 2019-02-09 PROCEDURE — 96366 THER/PROPH/DIAG IV INF ADDON: CPT

## 2019-02-09 PROCEDURE — 84443 ASSAY THYROID STIM HORMONE: CPT

## 2019-02-09 PROCEDURE — 92960 CARDIOVERSION ELECTRIC EXT: CPT

## 2019-02-09 PROCEDURE — 84484 ASSAY OF TROPONIN QUANT: CPT

## 2019-02-09 PROCEDURE — 36415 COLL VENOUS BLD VENIPUNCTURE: CPT

## 2019-02-09 PROCEDURE — 93320 DOPPLER ECHO COMPLETE: CPT

## 2019-02-09 PROCEDURE — 71045 X-RAY EXAM CHEST 1 VIEW: CPT

## 2019-02-09 RX ORDER — METOPROLOL TARTRATE 50 MG
1 TABLET ORAL
Qty: 30 | Refills: 0 | OUTPATIENT
Start: 2019-02-09 | End: 2019-03-10

## 2019-02-09 RX ORDER — ASPIRIN/CALCIUM CARB/MAGNESIUM 324 MG
1 TABLET ORAL
Qty: 0 | Refills: 0 | COMMUNITY
Start: 2019-02-09

## 2019-02-09 RX ORDER — ASPIRIN/CALCIUM CARB/MAGNESIUM 324 MG
81 TABLET ORAL DAILY
Qty: 0 | Refills: 0 | Status: DISCONTINUED | OUTPATIENT
Start: 2019-02-09 | End: 2019-02-09

## 2019-02-09 RX ADMIN — AMIODARONE HYDROCHLORIDE 200 MILLIGRAM(S): 400 TABLET ORAL at 05:02

## 2019-02-09 RX ADMIN — LISINOPRIL 20 MILLIGRAM(S): 2.5 TABLET ORAL at 05:02

## 2019-02-09 RX ADMIN — Medication 50 MILLIGRAM(S): at 05:02

## 2019-02-09 RX ADMIN — APIXABAN 5 MILLIGRAM(S): 2.5 TABLET, FILM COATED ORAL at 11:20

## 2019-02-09 RX ADMIN — CLOPIDOGREL BISULFATE 75 MILLIGRAM(S): 75 TABLET, FILM COATED ORAL at 11:20

## 2019-02-09 RX ADMIN — Medication 81 MILLIGRAM(S): at 11:20

## 2019-02-09 NOTE — DISCHARGE NOTE ADULT - MEDICATION SUMMARY - MEDICATIONS TO TAKE
I will START or STAY ON the medications listed below when I get home from the hospital:    aspirin 81 mg oral delayed release tablet  -- 1 tab(s) by mouth once a day  -- Indication: For cad    lisinopril 30 mg oral tablet  -- 1 tab(s) by mouth once a day  -- Indication: For cardiomyopathy    amiodarone 200 mg oral tablet  -- 1 tab(s) by mouth once a day  -- Indication: For Atrial flutter    apixaban 5 mg oral tablet  -- 1 tab(s) by mouth 2 times a day  -- Indication: For Atrial flutter    atorvastatin 40 mg oral tablet  -- 1 tab(s) by mouth once a day (at bedtime)  -- Indication: For cad    clopidogrel 75 mg oral tablet  -- 1 tab(s) by mouth once a day  -- Indication: For cad    Toprol-XL 50 mg oral tablet, extended release  -- 1 tab(s) by mouth once a day  -- Indication: For cad

## 2019-02-09 NOTE — DISCHARGE NOTE ADULT - HOSPITAL COURSE
Atrial fibrillation - Cardiology consultation noted. Status post cardioversion to sinus rhythm. On amiodarone and apixaban. To follow up with Cardiology on an outpatient basis for possible ablation. TSH level was low but T3/4 was within normal limits - repeat TSH in few weeks if normal f/u endocrinology as an outpatient. Pt was on Aspirin 325, plavix and Elquis, discussed with Dr. Couch to see ih needs to be on Triple therapy, recommend to decrease aspirin 81, c/w Plavix and Eliquis. Pt report feeling better, ambulating, Denied chest pain, SOB, palpitation, abd. pain, nausea, vomiting, headache, dizziness, numbness, tingling, urinary and bowel complaints.     ICU Vital Signs Last 24 Hrs  T(C): 36.4 (09 Feb 2019 04:57), Max: 36.8 (08 Feb 2019 15:20)  T(F): 97.5 (09 Feb 2019 04:57), Max: 98.2 (08 Feb 2019 15:20)  HR: 62 (09 Feb 2019 04:57) (51 - 62)  BP: 162/97 (09 Feb 2019 04:57) (129/85 - 162/97)  BP(mean): --  ABP: --  ABP(mean): --  RR: 18 (09 Feb 2019 04:57) (18 - 18)  SpO2: 95% (09 Feb 2019 04:57) (95% - 98%)    PHYSICAL EXAM:    GENERAL: NAD, well-groomed, well-developed  HEAD:  Atraumatic, Normocephalic  EYES: EOMI, PERRLA, conjunctiva and sclera clear  ENMT: No tonsillar erythema, exudates, or enlargement; Moist mucous membranes, Good dentition, No lesions  NECK: Supple, No JVD, Normal thyroid  NERVOUS SYSTEM:  Alert & Oriented X3, Good concentration; Motor Strength 5/5 B/L upper and lower extremities; DTRs 2+ intact and symmetric  CHEST/LUNG: Clear to percussion bilaterally; No rales, rhonchi, wheezing, or rubs  HEART: Regular rate and rhythm; No murmurs, rubs, or gallops  ABDOMEN: Soft, Nontender, Nondistended; Bowel sounds present  EXTREMITIES:  2+ Peripheral Pulses, No clubbing, cyanosis, or edema  LYMPH: No lymphadenopathy noted  SKIN: No rashes or lesions    time spent 36 minutes

## 2019-02-09 NOTE — DISCHARGE NOTE ADULT - CARE PLAN
Principal Discharge DX:	Atrial flutter with rapid ventricular response  Goal:	.  Assessment and plan of treatment:	c/w Eliquis, Metoprolol  f/u cardiology  Secondary Diagnosis:	Coronary artery disease involving native coronary artery of native heart, angina presence unspecified  Assessment and plan of treatment:	c/w aspirin 81, Plavix 75, Metoprolol, statin   f/u cardiology  Secondary Diagnosis:	HTN (hypertension)  Assessment and plan of treatment:	c/w home medication  Secondary Diagnosis:	CHF (congestive heart failure)  Goal:	chronic systolic chf - stable  Assessment and plan of treatment:	c/w home medication

## 2019-02-09 NOTE — DISCHARGE NOTE ADULT - CARE PROVIDER_API CALL
Rico Couch)  Cardiovascular Disease; Internal Medicine  [address] [place] [address]  Phone: [phone]  Fax: [phone]  Follow Up Time:

## 2019-02-09 NOTE — PROGRESS NOTE ADULT - SUBJECTIVE AND OBJECTIVE BOX
MARI DUQUE  [id number]        Chief Complaint: follow up atrial flutter s/p CODY/DCCV      Subjective: feels much better today, no cp/sob      24 hour Tele: SR, no events        amiodarone    Tablet 200 milliGRAM(s) Oral daily  apixaban 5 milliGRAM(s) Oral every 12 hours  aspirin enteric coated 81 milliGRAM(s) Oral daily  atorvastatin 40 milliGRAM(s) Oral at bedtime  clopidogrel Tablet 75 milliGRAM(s) Oral daily  lisinopril 20 milliGRAM(s) Oral daily  metoprolol succinate ER 50 milliGRAM(s) Oral daily          Physical Exam:  T(C): 36.4 (02-09-19 @ 04:57), Max: 36.8 (02-08-19 @ 15:20)  HR: 62 (02-09-19 @ 04:57) (51 - 62)  BP: 162/97 (02-09-19 @ 04:57) (129/85 - 162/97)  RR: 18 (02-09-19 @ 04:57) (18 - 18)  SpO2: 95% (02-09-19 @ 04:57) (95% - 98%)  Wt(kg): --  General: Comfortable in NAD  HEENT: MMM, sclera anicteric  Resp: CTA bilaterally  CVS: nl s1s2, rrr, no s3/JVD  Abd: soft, NT, ND, BS+  Ext: No c/c/e  Neuro A&O x3  Psych: Normal affect    I&O's Summary    08 Feb 2019 07:01  -  09 Feb 2019 07:00  --------------------------------------------------------  IN: 420 mL / OUT: 450 mL / NET: -30 mL          Labs:   09 Feb 2019 06:15    141    |  106    |  25.0   ----------------------------<  88     4.0     |  22.0   |  1.25     Ca    8.5        09 Feb 2019 06:15  Phos  3.4       09 Feb 2019 06:15  Mg     2.0       09 Feb 2019 06:15    TPro  6.9    /  Alb  3.8    /  TBili  1.4    /  DBili  x      /  AST  17     /  ALT  15     /  AlkPhos  111    07 Feb 2019 19:36                          13.8   7.2   )-----------( 86       ( 08 Feb 2019 08:27 )             41.3     PT/INR - ( 07 Feb 2019 19:12 )   PT: 19.7 sec;   INR: 1.69 ratio         PTT - ( 07 Feb 2019 19:12 )  PTT:33.6 sec  CARDIAC MARKERS ( 07 Feb 2019 19:36 )  x     / <0.01 ng/mL / x     / x     / x         Assessment:  58yMale PMHX tachy induced myopathy, atrial flutter, HTN, obesity, RHETT here with recurrent sx rapid atrial flutter. Previously had been cardioverted and EF was severe but returned to normal.  -EF now severely depressed again but expect improvement now that back in SR  -Tolerated CODY/DCCV well  -Had LAD PCI 2016, no recent stress testing. Recommend continuing DAPT and A/C until further evaluation then will re-address and consider cutting back to single antiplatelet with A/C    Plan:  1. CV stable for discharge  2. continue current CV meds, cut back ASA to 81mg daily  3. Outpatient follow up with Danish Perez and Jona, will need outpatient ablation  4. thanks!  Rico Couch MD

## 2019-02-09 NOTE — DISCHARGE NOTE ADULT - PLAN OF CARE
. c/w Eliquis, Metoprolol  f/u cardiology c/w aspirin 81, Plavix 75, Metoprolol, statin   f/u cardiology c/w home medication chronic systolic chf - stable

## 2019-02-09 NOTE — DISCHARGE NOTE ADULT - PATIENT PORTAL LINK FT
You can access the [facility], offered by [facility], by registering with the following website: [email or web address]

## 2019-02-09 NOTE — PROGRESS NOTE ADULT - SUBJECTIVE AND OBJECTIVE BOX
Case discussed with MD and plan is to change aspirin to 81, and continue triple therapy AC outpatient.  Patent is to follow up outpatient and repeat bmp in one week after discharge and send results to patients cardilogist.   PCP Ani aware of plan as well as patient.  Stable and no acute changes overnight. Case discussed with MD Couch and plan is to change aspirin to 81, and continue triple therapy AC outpatient.  Patent is to follow up outpatient and repeat bmp in one week after discharge and send results to patients cardiologist   PCP Lizz aware of plan as well as patient.  Stable and no acute changes overnight.

## 2019-02-09 NOTE — DISCHARGE NOTE ADULT - CARE PROVIDERS DIRECT ADDRESSES
[email or web address]

## 2019-02-09 NOTE — DISCHARGE NOTE ADULT - ADDITIONAL INSTRUCTIONS
f/u cardiology  Repeat thyroid function test in 2 weeks and if abnormal f/u endocrinology as an outpatient

## 2019-02-09 NOTE — DISCHARGE NOTE ADULT - SECONDARY DIAGNOSIS.
Coronary artery disease involving native coronary artery of native heart, angina presence unspecified HTN (hypertension) CHF (congestive heart failure)

## 2019-02-11 ENCOUNTER — INBOUND DOCUMENT (OUTPATIENT)
Age: 59
End: 2019-02-11

## 2019-02-27 ENCOUNTER — APPOINTMENT (OUTPATIENT)
Dept: CARDIOLOGY | Facility: CLINIC | Age: 59
End: 2019-02-27
Payer: COMMERCIAL

## 2019-02-27 ENCOUNTER — RECORD ABSTRACTING (OUTPATIENT)
Age: 59
End: 2019-02-27

## 2019-02-27 VITALS
RESPIRATION RATE: 16 BRPM | BODY MASS INDEX: 32.2 KG/M2 | WEIGHT: 230 LBS | HEART RATE: 130 BPM | DIASTOLIC BLOOD PRESSURE: 100 MMHG | SYSTOLIC BLOOD PRESSURE: 160 MMHG | HEIGHT: 71 IN

## 2019-02-27 PROCEDURE — 99214 OFFICE O/P EST MOD 30 MIN: CPT

## 2019-02-27 PROCEDURE — 93000 ELECTROCARDIOGRAM COMPLETE: CPT

## 2019-02-27 RX ORDER — ASPIRIN 325 MG/1
325 TABLET, FILM COATED ORAL
Refills: 0 | Status: DISCONTINUED | COMMUNITY
End: 2019-02-27

## 2019-02-27 NOTE — REASON FOR VISIT
[FreeTextEntry1] : Patient presents here for evaluation with regard to complaints of palpitations of several days duration after a recent 3 day hospitalization in [facility] for AF with RVR, s/p CODY/DCC 2/8/19. [place] well after discharge until just a few days ago.\par Denies any significant shortness of breath or chest pain at this time.\par Has now been using his CPAP, while he had not for many months previously despite severe obstructive sleep apnea and his understanding of the consequences.\par The patient had a history of a dilated cardiomyopathy with hypertensive heart disease that did improve with treatment of his hypertension and sleep apnea.\par Prior history of paroxysmal atrial fibrillation status post cardioversion.\par On amiodarone therapy, there has been significant prolongation of QT was found when we loaded him with more amiodarone.

## 2019-02-27 NOTE — ASSESSMENT
[FreeTextEntry1] : Atrial fib/flutter with variable rate and overall heart rate of 130. Diffuse nonspecific T wave flattening.\par \par Hospital data:\par TE 2/8/19:\par Severe global left ventricular systolic dysfunction. LVEF 20-25%.\par RV normal size with severely reduced function.\par Severe left atrial enlargement.\par Moderate right atrial enlargement.\par Mild mitral and tricuspid insufficiency.\par \par BNP 4439\par Creatinine as high as 1.38\par \par Impression:\par 1.S/P repeat DCC 2/8/19, there is recurrent atrial fib flutter of at least 2-3 days duration. Despite amiodarone therapy. Despite beta blocker therapy ventricular response is brisk.\par 2. Severe biventricular systolic dysfunction, possibly multifactorial. Due to tachycardia, uncontrolled HTN, untreated RHETT. \par 3. Elevated BNP suggests CHF at this point in time. \par 4.No signs of angina despite a prior history of coronary artery disease and LAD stenting.\par 5. Hypertension that seems to still be suboptimally controlled.\par 6. Obstructive sleep apnea which is severe and just now being treated.\par \par \par Plan:\par 1. Will increase Metoprolol to 100 mg PO BID for rate control  and BP purposes.\par 2. Repeat ECG 4 days\par 3. EP evaluation for consideration of atrial flutter ablation.\par 4. Resumption of use of CPAP.\par 5. Better controlled hypertension.\par

## 2019-02-28 ENCOUNTER — NON-APPOINTMENT (OUTPATIENT)
Age: 59
End: 2019-02-28

## 2019-02-28 ENCOUNTER — APPOINTMENT (OUTPATIENT)
Dept: ELECTROPHYSIOLOGY | Facility: CLINIC | Age: 59
End: 2019-02-28
Payer: COMMERCIAL

## 2019-02-28 VITALS
WEIGHT: 230 LBS | SYSTOLIC BLOOD PRESSURE: 140 MMHG | HEIGHT: 71 IN | HEART RATE: 128 BPM | BODY MASS INDEX: 32.2 KG/M2 | OXYGEN SATURATION: 96 % | DIASTOLIC BLOOD PRESSURE: 101 MMHG

## 2019-02-28 PROCEDURE — 99244 OFF/OP CNSLTJ NEW/EST MOD 40: CPT | Mod: 25

## 2019-02-28 PROCEDURE — 93000 ELECTROCARDIOGRAM COMPLETE: CPT

## 2019-02-28 NOTE — REVIEW OF SYSTEMS
[Fever] : no fever [Chills] : no chills [Feeling Fatigued] : feeling fatigued [Shortness Of Breath] : shortness of breath [Dyspnea on exertion] : dyspnea during exertion [Chest Pain] : no chest pain [Lower Ext Edema] : lower extremity edema [Palpitations] : palpitations [Dizziness] : no dizziness [Convulsions] : no convulsions [Confusion] : no confusion was observed [Anxiety] : anxiety [Easy Bleeding] : no tendency for easy bleeding [Easy Bruising] : no tendency for easy bruising [Negative] : Musculoskeletal

## 2019-02-28 NOTE — HISTORY OF PRESENT ILLNESS
[FreeTextEntry1] : 58 year old gentleman with history of RHETT, HTN, CAD s/p PCI (pLAD 2011, 12/2016), dilated cardiomyopathy (likely related to AF / tachycardia and HTN) now with recurrent symptomatic persistent atrial fibrillation and cardiomyopathy. \par He notes a long history of palpitation which initially was brief and not highly bothersome. However, he developed progressive and sustained palpitation and dyspnea in 12/2016, and presented with rapid atrial fibrillation at that time. He underwent CODY guided DCCV, and echo at that time noted severe global LV dysfunction with LVEF <20%. He was started on anticoagulation and amiodarone, as well as CHF medication. Cath at that time revealed LAD disease and he underwent PCI x2 to LAD as well. \par He then presented with recurrent AF in 5/2017, and was reloaded with amiodaorne but did develop QT prolongation and sinus bradycardia on higher dose amiodarone. He went back to sinus rhythm, and TTE in 6/2017 revealed LVEF 60-65%, and he felt significantly improved in sinus rhythm and with treatment of his other comorbidities including HTN and RHETT. He continued on medical therapy including amiodarone, metoprolol, and anticoagulation. \par He then recently presented again this month with recurrent palpitation and dyspnea with minimal exertion, and was found to be back in atrial fibrillation/atrial flutter with rapid ventricular rates. CODY/DCCV was again performed, and LVEF again noted to be severely depressed (LVEF <20%) on CODY. He maintained sinus rhythm for about 1 week, but on followup was back in AF with RVR, with recurrence of symptoms. On ECG today he remain in rapid atrial fibrillation (occasionally appears consistent with flutter, but mostly AF). His rate control has been increased (metoprolol from 50 qd to 100mg bid), and he has been continued on amiodarone. \par He reports dyspnea with mild exertion, LE edema, and palpitation. \par Current medications include Eliquis, , Plavix, amiodarone 200mg qd, Lisinopril, metoprolol. \par

## 2019-02-28 NOTE — DISCUSSION/SUMMARY
[FreeTextEntry1] : 58 year old gentleman with history of RHETT, HTN, CAD s/p PCI (pLAD 2011, 12/2016), and with recurrent symptomatic persistent atrial fibrillation with associated cardiomyopathy. HE has had recurrent episodes of AF with rapid ventricular rates which have presented with symptoms of dyspnea and palpitation, as well as recurrent cardiomyopathy with severe LV dysfunction. He now has had a recurrent episode and recurrence of CHF despite treatment with amiodarone and recent DCCV. CODY at time of DCCV revealed severe LV dysfunction which is likely a result of his AF with RVR, as TTE in setting of sinus rhythm in 6/2017 noted normal LV function. We discussed AF in general, and importance of strict rate control and preferably restoration of sinus rhythm for improvement in his symptoms and cardiac function. We reviewed options for management including antiarrhythmic medications, rate control, and /AVJ ablation, and catheter ablation of AF. Given his recurrence despite amiodarone, AF ablation is his best option for arrhythmia control and associated improvement in his CHF and symptoms. \par We reviewed the risks and benefits of AF ablation in detail, including procedure related risks of bleeding, vascular injury, stroke, MI, cardiac perforation, esophageal injury and death. Given his significant symptoms, and desire to avoid additional long-term medications and avoid progressive AF and associated morbidity, he expressed understanding  would like to proceed with AF ablation as soon as possible. \par \par -Will plan DCCV prior to ablation for more upfront symptom control, though he will likely again have recurrent AF. This was patient’s preference unless ablation can be performed in next week.\par -Will then proceed with ablation of drug refractory symptomatic persistent  AF as soon as possible. If he remains compliant with anticoagulation following recent CODY/DCCV will not repeat CODY again pre ablation. Hold Eliquis 1 day prior to ablation and bridge with lovenox. \par -amiodarone and Metoprolol for now. Will stop amiodarone at some point following ablation.\par -lifestyle modifications including treatment of sleep apnea encouraged.  \par -given evidence of volume overload in setting of CHF and AF /RVR, will start Lasix 20mg qd. Will check labs (electrolytes, renal function) next week at time of DCCV\par

## 2019-02-28 NOTE — REASON FOR VISIT
[Consultation] : a consultation regarding [Atrial Fibrillation] : atrial fibrillation [FreeTextEntry1] : ref Dr Perez

## 2019-02-28 NOTE — PHYSICAL EXAM
[General Appearance - Well Developed] : well developed [General Appearance - Well Nourished] : well nourished [General Appearance - In No Acute Distress] : no acute distress [Normal Conjunctiva] : the conjunctiva exhibited no abnormalities [Normal Oral Mucosa] : normal oral mucosa [JVD Elevated _____cm] : JVD elevated [unfilled] ~U cm above clavicle [Heart Sounds] : normal S1 and S2 [Murmurs] : no murmurs present [FreeTextEntry1] : irregularly irregular and tachycardic. 1+ le edema bilaterally to mid shin [Respiration, Rhythm And Depth] : normal respiratory rhythm and effort [Auscultation Breath Sounds / Voice Sounds] : lungs were clear to auscultation bilaterally [Bowel Sounds] : normal bowel sounds [Abdomen Soft] : soft [Abdomen Tenderness] : non-tender [Abnormal Walk] : normal gait [Nail Clubbing] : no clubbing of the fingernails [Cyanosis, Localized] : no localized cyanosis [Skin Color & Pigmentation] : normal skin color and pigmentation [] : no rash [Oriented To Time, Place, And Person] : oriented to person, place, and time [Impaired Insight] : insight and judgment were intact

## 2019-03-06 ENCOUNTER — APPOINTMENT (OUTPATIENT)
Dept: CARDIOLOGY | Facility: CLINIC | Age: 59
End: 2019-03-06
Payer: COMMERCIAL

## 2019-03-06 VITALS — RESPIRATION RATE: 12 BRPM | SYSTOLIC BLOOD PRESSURE: 146 MMHG | HEART RATE: 112 BPM | DIASTOLIC BLOOD PRESSURE: 90 MMHG

## 2019-03-06 PROCEDURE — 93000 ELECTROCARDIOGRAM COMPLETE: CPT

## 2019-03-06 PROCEDURE — 99211 OFF/OP EST MAY X REQ PHY/QHP: CPT | Mod: 25

## 2019-03-07 ENCOUNTER — OUTPATIENT (OUTPATIENT)
Dept: OUTPATIENT SERVICES | Facility: HOSPITAL | Age: 59
LOS: 1 days | End: 2019-03-07
Payer: COMMERCIAL

## 2019-03-07 ENCOUNTER — APPOINTMENT (OUTPATIENT)
Dept: CARDIOLOGY | Facility: CLINIC | Age: 59
End: 2019-03-07

## 2019-03-07 VITALS
TEMPERATURE: 98 F | DIASTOLIC BLOOD PRESSURE: 94 MMHG | RESPIRATION RATE: 18 BRPM | HEART RATE: 120 BPM | SYSTOLIC BLOOD PRESSURE: 152 MMHG | OXYGEN SATURATION: 94 %

## 2019-03-07 VITALS
DIASTOLIC BLOOD PRESSURE: 94 MMHG | HEIGHT: 71 IN | WEIGHT: 227.08 LBS | SYSTOLIC BLOOD PRESSURE: 152 MMHG | RESPIRATION RATE: 18 BRPM | HEART RATE: 120 BPM | TEMPERATURE: 98 F

## 2019-03-07 DIAGNOSIS — Z90.89 ACQUIRED ABSENCE OF OTHER ORGANS: Chronic | ICD-10-CM

## 2019-03-07 DIAGNOSIS — Z98.890 OTHER SPECIFIED POSTPROCEDURAL STATES: Chronic | ICD-10-CM

## 2019-03-07 DIAGNOSIS — I48.1 PERSISTENT ATRIAL FIBRILLATION: ICD-10-CM

## 2019-03-07 LAB
ANION GAP SERPL CALC-SCNC: 14 MMOL/L — SIGNIFICANT CHANGE UP (ref 5–17)
APTT BLD: 29.9 SEC — SIGNIFICANT CHANGE UP (ref 27.5–36.3)
BLD GP AB SCN SERPL QL: SIGNIFICANT CHANGE UP
BUN SERPL-MCNC: 23 MG/DL — HIGH (ref 8–20)
CALCIUM SERPL-MCNC: 8.8 MG/DL — SIGNIFICANT CHANGE UP (ref 8.6–10.2)
CHLORIDE SERPL-SCNC: 104 MMOL/L — SIGNIFICANT CHANGE UP (ref 98–107)
CO2 SERPL-SCNC: 26 MMOL/L — SIGNIFICANT CHANGE UP (ref 22–29)
CREAT SERPL-MCNC: 1.15 MG/DL — SIGNIFICANT CHANGE UP (ref 0.5–1.3)
GLUCOSE SERPL-MCNC: 105 MG/DL — SIGNIFICANT CHANGE UP (ref 70–115)
HCT VFR BLD CALC: 44.6 % — SIGNIFICANT CHANGE UP (ref 42–52)
HGB BLD-MCNC: 14.6 G/DL — SIGNIFICANT CHANGE UP (ref 14–18)
INR BLD: 1.35 RATIO — HIGH (ref 0.88–1.16)
MAGNESIUM SERPL-MCNC: 2.1 MG/DL — SIGNIFICANT CHANGE UP (ref 1.6–2.6)
MCHC RBC-ENTMCNC: 30 PG — SIGNIFICANT CHANGE UP (ref 27–31)
MCHC RBC-ENTMCNC: 32.7 G/DL — SIGNIFICANT CHANGE UP (ref 32–36)
MCV RBC AUTO: 91.8 FL — SIGNIFICANT CHANGE UP (ref 80–94)
PLATELET # BLD AUTO: 95 K/UL — LOW (ref 150–400)
POTASSIUM SERPL-MCNC: 3.7 MMOL/L — SIGNIFICANT CHANGE UP (ref 3.5–5.3)
POTASSIUM SERPL-SCNC: 3.7 MMOL/L — SIGNIFICANT CHANGE UP (ref 3.5–5.3)
PROTHROM AB SERPL-ACNC: 15.7 SEC — HIGH (ref 10–12.9)
RBC # BLD: 4.86 M/UL — SIGNIFICANT CHANGE UP (ref 4.6–6.2)
RBC # FLD: 13.1 % — SIGNIFICANT CHANGE UP (ref 11–15.6)
SODIUM SERPL-SCNC: 144 MMOL/L — SIGNIFICANT CHANGE UP (ref 135–145)
TYPE + AB SCN PNL BLD: SIGNIFICANT CHANGE UP
WBC # BLD: 7.9 K/UL — SIGNIFICANT CHANGE UP (ref 4.8–10.8)
WBC # FLD AUTO: 7.9 K/UL — SIGNIFICANT CHANGE UP (ref 4.8–10.8)

## 2019-03-07 PROCEDURE — 93010 ELECTROCARDIOGRAM REPORT: CPT

## 2019-03-07 RX ORDER — ENOXAPARIN SODIUM 100 MG/ML
100 INJECTION SUBCUTANEOUS ONCE
Qty: 0 | Refills: 0 | Status: DISCONTINUED | OUTPATIENT
Start: 2019-03-07 | End: 2019-03-22

## 2019-03-07 RX ORDER — ENOXAPARIN SODIUM 100 MG/ML
1 INJECTION SUBCUTANEOUS
Qty: 1 | Refills: 0 | OUTPATIENT
Start: 2019-03-07

## 2019-03-07 RX ORDER — LISINOPRIL 2.5 MG/1
1 TABLET ORAL
Qty: 0 | Refills: 0 | COMMUNITY

## 2019-03-07 NOTE — H&P PST ADULT - FAMILY HISTORY
Father  Still living? Unknown  Family history of coronary artery disease, Age at diagnosis: Age Unknown     Grandparent  Still living? Unknown  Family history of coronary artery disease, Age at diagnosis: Age Unknown

## 2019-03-07 NOTE — H&P PST ADULT - HISTORY OF PRESENT ILLNESS
58 year old gentleman with history of RHETT, HTN, CAD s/p PCI (pLAD , 2016), dilated cardiomyopathy (likely related to AF / tachycardia and HTN) now with recurrent symptomatic persistent atrial fibrillation and cardiomyopathy.   He notes a long history of palpitation which initially was brief and not highly bothersome. However, he developed progressive and sustained palpitation and dyspnea in 2016, and presented with rapid atrial fibrillation at that time. He underwent CODY guided DCCV, and echo at that time noted severe global LV dysfunction with LVEF <20%. He was started on anticoagulation and amiodarone, as well as CHF medication. Cath at that time revealed LAD disease and he underwent PCI x2 to LAD as well.   He then presented with recurrent AF in 2017, and was reloaded with amiodaorne but did develop QT prolongation and sinus bradycardia on higher dose amiodarone. He went back to sinus rhythm, and TTE in 2017 revealed LVEF 60-65%, and he felt significantly improved in sinus rhythm and with treatment of his other comorbidities including HTN and RHETT. He continued on medical therapy including amiodarone, metoprolol, and anticoagulation.   He then recently presented again this month with recurrent palpitation and dyspnea with minimal exertion, and was found to be back in atrial fibrillation/atrial flutter with rapid ventricular rates. CODY/DCCV was again performed, and LVEF again noted to be severely depressed (LVEF <20%) on CODY. He maintained sinus rhythm for about 1 week, but on followup was back in AF with RVR, with recurrence of symptoms. On ECG today he remain in rapid atrial fibrillation (occasionally appears consistent with flutter, but mostly AF). His rate control has been increased (metoprolol from 50 qd to 100mg bid), and he has been continued on amiodarone.   He reports dyspnea with mild exertion, LE edema, and palpitation.   Current medications include Eliquis, , Plavix, amiodarone 200mg qd, Lisinopril, metoprolol.      Cardiology Summary    EK19, atrial [id number] bpm, nonspecifric Tw abnormalities   Echo: CODY 19 LVEF 20-25%, severe LAe, mild TR and MR, TTE 17 LVEF 60-65%, mild conc LVH, LA nml, trace MR   Cardiac Cath: 2016, pLAD 70%, mLAD 80% ISR, oLCx 50%, PCI to LAD x2 59 yo male with a pmhx HTN, HLD, CAD s/p PCI to LAD x 2 2016 ([facility]), dilated cardiomyopathy (EF 30%) dx 2016, RHETT recently started using his CPAP and pAF on Eliquis s/p CODY/DCCV 2016 and intolerant of amiodarone secondary to  bradycardia and prolonged QTc.  He recently had recurrence of palpitations and dyspnea with minimal exertion and was found to be back in atrial fibrillation/atrial flutter with rapid ventricular rates.  CODY/DCCV was performed 2019 and LVEF again noted to be severely depressed (LVEF <20%) on CODY.  He maintained sinus rhythm for about 1 week after his recent DCCV but returned to symptomatic AF w/RVR.  Pt presents today for PST in anticipation of atrial fibrillation on 3/8/2019.    He complains of frequent palpitations and dyspnea on exertion.  Pt denies any other complaints at this time.     Cardiology Summary:  EK19, atrial [id number] bpm, nonspecifric Tw abnormalities   Echo: CODY 19 LVEF 20-25%, severe LAe, mild TR and MR; TTE 17 LVEF 60-65%, mild conc LVH, LA nml, trace MR   Cardiac Cath: 2016, pLAD 70%, mLAD 80% ISR, oLCx 50%, PCI to LAD x2

## 2019-03-07 NOTE — H&P PST ADULT - ASSESSMENT
57 yo male with a pmhx HTN, HLD, CAD s/p PCI to LAD x 2 12/2016 ([facility]), dilated cardiomyopathy (EF 30%) dx 12/2016, RHETT recently started using his CPAP and pAF on Eliquis s/p CODY/DCCV 12/2016 and intolerant of amiodarone secondary to  bradycardia and prolonged QTc.  He recently had recurrence of palpitations and dyspnea with minimal exertion and was found to be back in atrial fibrillation/atrial flutter with rapid ventricular rates.  CODY/DCCV was performed 2/8/2019 and LVEF again noted to be severely depressed (LVEF <20%) on CODY.  He maintained sinus rhythm for about 1 week after his recent DCCV but returned to symptomatic AF w/RVR.  Pt presents today for PST in anticipation of atrial fibrillation on 3/8/2019.      Pt originally scheduled for CODY today pre-AF ablation however, pt had recent CODY 2/8/19 which revealed no AMADA thrombus.  Pt indicates he takes the Eliquis as prescribed twice a day and has not missed a dose.  Discussed with Dr. Meyer, advised ok to not repeat CODY today.  Plan for AF ablation as scheduled.      Plan:   Pre-procedure instructions provided (verbal & written) as follows:    AF Ablation 3/8/2019 (13:30 arrival)   -Lovenox 100 mg x 1 administered during PST.  Additional dose of lovenox 100mg today PM.    -No eliquis today or tomorrow morning.    - NPO after midnight prior except meds w/ sips of water.    - Advised to bring CPAP machine with him day of procedure.    Discharge teaching initiated.

## 2019-03-08 ENCOUNTER — TRANSCRIPTION ENCOUNTER (OUTPATIENT)
Age: 59
End: 2019-03-08

## 2019-03-08 ENCOUNTER — INPATIENT (INPATIENT)
Facility: HOSPITAL | Age: 59
LOS: 0 days | Discharge: HOME | DRG: 274 | End: 2019-03-09
Attending: STUDENT IN AN ORGANIZED HEALTH CARE EDUCATION/TRAINING PROGRAM | Admitting: STUDENT IN AN ORGANIZED HEALTH CARE EDUCATION/TRAINING PROGRAM
Payer: COMMERCIAL

## 2019-03-08 ENCOUNTER — NON-APPOINTMENT (OUTPATIENT)
Age: 59
End: 2019-03-08

## 2019-03-08 VITALS
TEMPERATURE: 99 F | OXYGEN SATURATION: 97 % | RESPIRATION RATE: 20 BRPM | DIASTOLIC BLOOD PRESSURE: 92 MMHG | SYSTOLIC BLOOD PRESSURE: 161 MMHG | HEART RATE: 127 BPM

## 2019-03-08 DIAGNOSIS — Z98.890 OTHER SPECIFIED POSTPROCEDURAL STATES: Chronic | ICD-10-CM

## 2019-03-08 DIAGNOSIS — Z90.89 ACQUIRED ABSENCE OF OTHER ORGANS: Chronic | ICD-10-CM

## 2019-03-08 DIAGNOSIS — I48.1 PERSISTENT ATRIAL FIBRILLATION: ICD-10-CM

## 2019-03-08 LAB — ABO RH CONFIRMATION: SIGNIFICANT CHANGE UP

## 2019-03-08 PROCEDURE — 93662 INTRACARDIAC ECG (ICE): CPT | Mod: 26

## 2019-03-08 PROCEDURE — 85730 THROMBOPLASTIN TIME PARTIAL: CPT

## 2019-03-08 PROCEDURE — 83735 ASSAY OF MAGNESIUM: CPT

## 2019-03-08 PROCEDURE — 93657 TX L/R ATRIAL FIB ADDL: CPT

## 2019-03-08 PROCEDURE — 93010 ELECTROCARDIOGRAM REPORT: CPT

## 2019-03-08 PROCEDURE — 86901 BLOOD TYPING SEROLOGIC RH(D): CPT

## 2019-03-08 PROCEDURE — 93613 INTRACARDIAC EPHYS 3D MAPG: CPT

## 2019-03-08 PROCEDURE — 86850 RBC ANTIBODY SCREEN: CPT

## 2019-03-08 PROCEDURE — 93656 COMPRE EP EVAL ABLTJ ATR FIB: CPT

## 2019-03-08 PROCEDURE — 85027 COMPLETE CBC AUTOMATED: CPT

## 2019-03-08 PROCEDURE — 86900 BLOOD TYPING SEROLOGIC ABO: CPT

## 2019-03-08 PROCEDURE — 85610 PROTHROMBIN TIME: CPT

## 2019-03-08 PROCEDURE — 93005 ELECTROCARDIOGRAM TRACING: CPT

## 2019-03-08 PROCEDURE — 36415 COLL VENOUS BLD VENIPUNCTURE: CPT

## 2019-03-08 PROCEDURE — 80048 BASIC METABOLIC PNL TOTAL CA: CPT

## 2019-03-08 PROCEDURE — 86923 COMPATIBILITY TEST ELECTRIC: CPT

## 2019-03-08 PROCEDURE — 93623 PRGRMD STIMJ&PACG IV RX NFS: CPT | Mod: 26

## 2019-03-08 RX ORDER — APIXABAN 2.5 MG/1
5 TABLET, FILM COATED ORAL EVERY 12 HOURS
Qty: 0 | Refills: 0 | Status: DISCONTINUED | OUTPATIENT
Start: 2019-03-08 | End: 2019-03-09

## 2019-03-08 RX ORDER — CLOPIDOGREL BISULFATE 75 MG/1
75 TABLET, FILM COATED ORAL DAILY
Qty: 0 | Refills: 0 | Status: DISCONTINUED | OUTPATIENT
Start: 2019-03-08 | End: 2019-03-09

## 2019-03-08 RX ORDER — LISINOPRIL 2.5 MG/1
30 TABLET ORAL DAILY
Qty: 0 | Refills: 0 | Status: DISCONTINUED | OUTPATIENT
Start: 2019-03-08 | End: 2019-03-09

## 2019-03-08 RX ORDER — AMIODARONE HYDROCHLORIDE 400 MG/1
200 TABLET ORAL DAILY
Qty: 0 | Refills: 0 | Status: DISCONTINUED | OUTPATIENT
Start: 2019-03-08 | End: 2019-03-08

## 2019-03-08 RX ORDER — BENZOCAINE AND MENTHOL 5; 1 G/100ML; G/100ML
1 LIQUID ORAL
Qty: 0 | Refills: 0 | Status: DISCONTINUED | OUTPATIENT
Start: 2019-03-08 | End: 2019-03-09

## 2019-03-08 RX ORDER — LOSARTAN POTASSIUM 100 MG/1
100 TABLET, FILM COATED ORAL DAILY
Qty: 0 | Refills: 0 | Status: DISCONTINUED | OUTPATIENT
Start: 2019-03-08 | End: 2019-03-09

## 2019-03-08 RX ORDER — ACETAMINOPHEN 500 MG
650 TABLET ORAL EVERY 6 HOURS
Qty: 0 | Refills: 0 | Status: DISCONTINUED | OUTPATIENT
Start: 2019-03-08 | End: 2019-03-09

## 2019-03-08 RX ORDER — ASPIRIN/CALCIUM CARB/MAGNESIUM 324 MG
81 TABLET ORAL DAILY
Qty: 0 | Refills: 0 | Status: DISCONTINUED | OUTPATIENT
Start: 2019-03-08 | End: 2019-03-09

## 2019-03-08 RX ORDER — PANTOPRAZOLE SODIUM 20 MG/1
40 TABLET, DELAYED RELEASE ORAL
Qty: 0 | Refills: 0 | Status: DISCONTINUED | OUTPATIENT
Start: 2019-03-08 | End: 2019-03-09

## 2019-03-08 RX ORDER — METOPROLOL TARTRATE 50 MG
150 TABLET ORAL
Qty: 0 | Refills: 0 | COMMUNITY

## 2019-03-08 RX ORDER — ATORVASTATIN CALCIUM 80 MG/1
40 TABLET, FILM COATED ORAL AT BEDTIME
Qty: 0 | Refills: 0 | Status: DISCONTINUED | OUTPATIENT
Start: 2019-03-08 | End: 2019-03-09

## 2019-03-08 RX ORDER — SUCRALFATE 1 G
1 TABLET ORAL
Qty: 0 | Refills: 0 | Status: DISCONTINUED | OUTPATIENT
Start: 2019-03-08 | End: 2019-03-09

## 2019-03-08 RX ORDER — FUROSEMIDE 40 MG
20 TABLET ORAL ONCE
Qty: 0 | Refills: 0 | Status: COMPLETED | OUTPATIENT
Start: 2019-03-08 | End: 2019-03-09

## 2019-03-08 RX ORDER — ONDANSETRON 8 MG/1
4 TABLET, FILM COATED ORAL EVERY 6 HOURS
Qty: 0 | Refills: 0 | Status: DISCONTINUED | OUTPATIENT
Start: 2019-03-08 | End: 2019-03-09

## 2019-03-08 RX ORDER — OXYCODONE HYDROCHLORIDE 5 MG/1
5 TABLET ORAL EVERY 6 HOURS
Qty: 0 | Refills: 0 | Status: DISCONTINUED | OUTPATIENT
Start: 2019-03-08 | End: 2019-03-09

## 2019-03-08 RX ORDER — FUROSEMIDE 40 MG
20 TABLET ORAL DAILY
Qty: 0 | Refills: 0 | Status: DISCONTINUED | OUTPATIENT
Start: 2019-03-08 | End: 2019-03-09

## 2019-03-08 RX ORDER — FENTANYL CITRATE 50 UG/ML
25 INJECTION INTRAVENOUS
Qty: 0 | Refills: 0 | Status: DISCONTINUED | OUTPATIENT
Start: 2019-03-08 | End: 2019-03-09

## 2019-03-08 RX ORDER — METOPROLOL TARTRATE 50 MG
50 TABLET ORAL EVERY 12 HOURS
Qty: 0 | Refills: 0 | Status: DISCONTINUED | OUTPATIENT
Start: 2019-03-08 | End: 2019-03-09

## 2019-03-08 RX ORDER — ALPRAZOLAM 0.25 MG
0.25 TABLET ORAL EVERY 6 HOURS
Qty: 0 | Refills: 0 | Status: DISCONTINUED | OUTPATIENT
Start: 2019-03-08 | End: 2019-03-09

## 2019-03-08 NOTE — DISCHARGE NOTE ADULT - ADDITIONAL INSTRUCTIONS
Follow up with Dr. Meyer in 2-3 weeks.  Our office will contact you in 3-5 days to schedule this appointment. Please call [phone] with questions or concerns.

## 2019-03-08 NOTE — DISCHARGE NOTE ADULT - CARE PROVIDER_API CALL
Sedrick Meyer)  Cardiology; Internal Medicine  [address] [facility], [address] [place] [address]  Phone: [phone]  Fax: [phone]  Follow Up Time:

## 2019-03-08 NOTE — DISCHARGE NOTE ADULT - MEDICATION SUMMARY - MEDICATIONS TO TAKE
I will START or STAY ON the medications listed below when I get home from the hospital:    aspirin 81 mg oral delayed release tablet  -- 1 tab(s) by mouth once a day  -- Indication: For Aspirin    lisinopril 30 mg oral tablet  -- 1 tab(s) by mouth once a day  -- Indication: For blood pressure    apixaban 5 mg oral tablet  -- 1 tab(s) by mouth 2 times a day  -- Indication: For blood thinner    atorvastatin 40 mg oral tablet  -- 1 tab(s) by mouth once a day (at bedtime)  -- Indication: For cholesterol    valsartan-hydrochlorothiazide 160mg-12.5mg oral tablet  -- 1 tab(s) by mouth once a day  -- Indication: For blood presure    clopidogrel 75 mg oral tablet  -- 1 tab(s) by mouth once a day  -- Indication: For Anti platelet    metoprolol succinate 50 mg oral tablet, extended release  -- 1 tab(s) by mouth every 12 hours  -- Indication: For blood pressure    furosemide 20 mg oral tablet  -- 1 tab(s) by mouth once a day  -- Indication: For water pill    sucralfate 1 g/10 mL oral suspension  -- 10 milliliter(s) by mouth 2 times a day  -- Indication: For stomach for 2 weeks twice a day    pantoprazole 40 mg oral delayed release tablet  -- 1 tab(s) by mouth 2 times a day  -- Indication: For stomach for two weeks twice a day then daily for 4 weeks

## 2019-03-08 NOTE — PROGRESS NOTE ADULT - SUBJECTIVE AND OBJECTIVE BOX
PROCEDURE(S): Radiofrequency Ablation of Atrial Fibrillation    ELECTRPHYSIOLOGIST(S): Sedrick Meyer MD         COMPLICATIONS:  none        DISPOSITION: observation      CONDITION: stable      Pt doing well s/p atrial fibrillation ablation ([facility]/PVI/posterior wall/CTI).  Pt denies complaint post procedure.       MEDICATIONS  (STANDING):  apixaban 5 milliGRAM(s) Oral every 12 hours  aspirin enteric coated 81 milliGRAM(s) Oral daily  atorvastatin 40 milliGRAM(s) Oral at bedtime  clopidogrel Tablet 75 milliGRAM(s) Oral daily  furosemide    Tablet 20 milliGRAM(s) Oral daily  furosemide   Injectable 20 milliGRAM(s) IV Push once  lisinopril 30 milliGRAM(s) Oral daily  losartan 100 milliGRAM(s) Oral daily  metoprolol succinate ER 50 milliGRAM(s) Oral every 12 hours  pantoprazole    Tablet 40 milliGRAM(s) Oral two times a day  sucralfate suspension 1 Gram(s) Oral two times a day    MEDICATIONS  (PRN):  acetaminophen   Tablet .. 650 milliGRAM(s) Oral every 6 hours PRN Mild Pain (1 - 3)  ALPRAZolam 0.25 milliGRAM(s) Oral every 6 hours PRN anxiety/insomnia  aluminum hydroxide/magnesium hydroxide/simethicone Suspension 30 milliLiter(s) Oral every 4 hours PRN Dyspepsia  benzocaine 15 mG/menthol 3.6 mG (Sugar-Free) Lozenge 1 Lozenge Oral every 2 hours PRN Sore Throat  fentaNYL    Injectable 25 MICROGram(s) IV Push every 30 minutes PRN Severe Pain (7 - 10)  ondansetron Injectable 4 milliGRAM(s) IV Push every 6 hours PRN Nausea and/or Vomiting  oxyCODONE    IR 5 milliGRAM(s) Oral every 6 hours PRN Moderate Pain (4 - 6)      Allergies:  shellfish (Hives)  sulfa drugs (Rash (Unknown))      Exam:   T(C): 37 (03-08-19 @ 14:17), Max: 37 (03-08-19 @ 14:17)  HR: 127 (03-08-19 @ 14:17) (127 - 127)  BP: 161/92 (03-08-19 @ 14:17) (161/92 - 161/92)  RR: 20 (03-08-19 @ 14:17) (20 - 20)  SpO2: 97% (03-08-19 @ 14:17) (97% - 97%)    VSS, NAD, A&O x 3  Card: S1/S2, RRR, no m/g/r  Resp: lungs CTA b/l  Abd: S/NT/ND  Groins: hemostatic sutures in place; sites C/D/I; no bleeding, hematoma, erythema, exudate or edema  Ext: no edema; distal pulses intact    ECG: Sinus bradycardia 40s intact conduction, mildly prolonged QT     Assessment:   59 yo male with a pmhx HTN, HLD, CAD s/p PCI to LAD x 2 12/2016 ([facility]), dilated cardiomyopathy (EF 30%) dx 12/2016, RHETT recently started using his CPAP and pAF on Eliquis s/p CODY/DCCV 12/2016 and intolerant of amiodarone secondary to  bradycardia and prolonged QTc.  He recently had recurrence of palpitations and dyspnea with minimal exertion and was found to be back in atrial fibrillation/atrial flutter with rapid ventricular rates.  CODY/DCCV was performed 2/8/2019 and LVEF again noted to be severely depressed (LVEF <20%) on CODY.  He maintained sinus rhythm for about 1 week after his recent DCCV but returned to symptomatic AF w/RVR.  Pt is now status post uncomplicated radiofrequency ablation of atrial fibrillation.          Plan:   Bedrest x 4 hours, then OOB with assistance and progress as tolerated.   Groin sutures to be removed by EP service in AM.   Radial art line to be removed once pt fully awake with stable vitals >1 hour.    Resume Eliquis 5mg PO BID tonight  Discontinue amiodarone.  Decrease metoprolol to 50mg PO BID    Continue other home medications.     Start Protonix 40mg BID x 2 weeks then daily x 4 weeks.     Carafate 1gm BID x 2 weeks.   Strict I/Os.  Please encourage incentive spirometry and ambulation once able.  Observation and monitoring on telemetry overnight with anticipated discharge in the AM and outpt follow up in 1 month.

## 2019-03-08 NOTE — DISCHARGE NOTE ADULT - CARE PLAN
Principal Discharge DX:	Atrial fibrillation  Goal:	optimize cardiac health  Assessment and plan of treatment:	- Bruising at the groin, sometimes extending down the leg, and/or a small lump under the skin at the groin access site is normal and will resolve within 2 – 3 weeks.   - Occasional skipped beats or palpitations that last for a few beats are common and generally resolve within 1-2 months.   - You may walk and take stairs at a regular pace.   - Do not perform any exercise more strenuous than walking for 1 week.   - Do not strain or lift heavy objects for 1 week.  - You may shower the day after the procedure.  - Do not soak in water (such as tub baths, hot tubs, swimming, etc.) for 1 week.   - You may resume all other activities the day after the procedure.  Call your doctor if:   - you notice bleeding, redness, drainage, swelling, increased tenderness or a hot sensation around the catheter insertion site.   - your temperature is greater than 100 degrees F for more than 24 hours.  - your rapid heart rhythm returns.  - you have any questions or concerns regarding the procedure.  If significant bleeding and/or a large lump (the size of a golf ball or bigger) occurs:  - Lie flat and apply continuous direct pressure just above the puncture site for at least 10 minutes  - If the issue resolves, notify your physician immediately.    - If the bleeding cannot be controlled, please seek immediate medical attention.  If you experience increased difficulty breathing or chest pain, or if you faint or have dizzy spells, please seek immediate medical attention.

## 2019-03-08 NOTE — PROGRESS NOTE ADULT - SUBJECTIVE AND OBJECTIVE BOX
Admission Criteria  Please admit the patient to the following service: CARDIOLOGY    Major Criteria:    - LV dysfunction (EF<30%)  - Continuous EKG monitoring is required for condition causing arrhythmia (hyperkalemia, etc)  - Significant volume load > 200 ml      Admit to: 3GUL     Patient is being admitted to the inpatient service due to high risk characteristics and need for further management/monitoring and is considered to be at a significantly increased risk of major adverse cardiac and vascular events if discharged.

## 2019-03-08 NOTE — DISCHARGE NOTE ADULT - PATIENT PORTAL LINK FT
You can access the [facility] Patient Portal, offered by [facility], by registering with the following website: [email or web address]

## 2019-03-08 NOTE — DISCHARGE NOTE ADULT - MEDICATION SUMMARY - MEDICATIONS TO STOP TAKING
I will STOP taking the medications listed below when I get home from the hospital:    amiodarone 200 mg oral tablet  -- 1 tab(s) by mouth once a day    enoxaparin 100 mg/mL injectable solution  -- 1 milliliter(s) subcutaneously once  3/7 PM. Last dose Eliquis 3/6 PM. No eliquis or Lovenox 3/8.   -- It is very important that you take or use this exactly as directed.  Do not skip doses or discontinue unless directed by your doctor.

## 2019-03-08 NOTE — DISCHARGE NOTE ADULT - PLAN OF CARE
optimize cardiac health - Bruising at the groin, sometimes extending down the leg, and/or a small lump under the skin at the groin access site is normal and will resolve within 2 – 3 weeks.   - Occasional skipped beats or palpitations that last for a few beats are common and generally resolve within 1-2 months.   - You may walk and take stairs at a regular pace.   - Do not perform any exercise more strenuous than walking for 1 week.   - Do not strain or lift heavy objects for 1 week.  - You may shower the day after the procedure.  - Do not soak in water (such as tub baths, hot tubs, swimming, etc.) for 1 week.   - You may resume all other activities the day after the procedure.  Call your doctor if:   - you notice bleeding, redness, drainage, swelling, increased tenderness or a hot sensation around the catheter insertion site.   - your temperature is greater than 100 degrees F for more than 24 hours.  - your rapid heart rhythm returns.  - you have any questions or concerns regarding the procedure.  If significant bleeding and/or a large lump (the size of a golf ball or bigger) occurs:  - Lie flat and apply continuous direct pressure just above the puncture site for at least 10 minutes  - If the issue resolves, notify your physician immediately.    - If the bleeding cannot be controlled, please seek immediate medical attention.  If you experience increased difficulty breathing or chest pain, or if you faint or have dizzy spells, please seek immediate medical attention.

## 2019-03-08 NOTE — DISCHARGE NOTE ADULT - HOSPITAL COURSE
59 yo male with a pmhx HTN, HLD, CAD s/p PCI to LAD x 2 12/2016 ([facility]), dilated cardiomyopathy (EF 30%) dx 12/2016, RHETT recently started using his CPAP and pAF on Eliquis s/p CODY/DCCV 12/2016 and intolerant of amiodarone secondary to  bradycardia and prolonged QTc.  He recently had recurrence of palpitations and dyspnea with minimal exertion and was found to be back in atrial fibrillation/atrial flutter with rapid ventricular rates.  CODY/DCCV was performed 2/8/2019 and LVEF again noted to be severely depressed (LVEF <20%) on CODY.  He maintained sinus rhythm for about 1 week after his recent DCCV but returned to symptomatic AF w/RVR.  Pt is now s/p elective atrial fibrillation radiofrequency ablation...*** 57 yo male with a pmhx HTN, HLD, CAD s/p PCI to LAD x 2 12/2016 ([facility]), dilated cardiomyopathy (EF 30%) dx 12/2016, RHETT recently started using his CPAP and pAF on Eliquis s/p CODY/DCCV 12/2016 and intolerant of amiodarone secondary to  bradycardia and prolonged QTc.  He recently had recurrence of palpitations and dyspnea with minimal exertion and was found to be back in atrial fibrillation/atrial flutter with rapid ventricular rates.  CODY/DCCV was performed 2/8/2019 and LVEF again noted to be severely depressed (LVEF <20%) on CODY.  He maintained sinus rhythm for about 1 week after his recent DCCV but returned to symptomatic AF w/RVR.  Pt is now s/p elective atrial fibrillation radiofrequency ablation no complaints overnight. B/L groin sutures removed without incident. B/L groin sites benign. Patient awake and alert without complaints. Denies chest pain, sob, palps. Stable for discharge today.

## 2019-03-08 NOTE — PROGRESS NOTE ADULT - SUBJECTIVE AND OBJECTIVE BOX
Pt presents today in anticipation of atrial fibrillation radiofrequency ablation.  Pt stopped Eliquis on 3/6/19 and bridged with lovenox.  He has been NPO since midnight.  No changes since last seen in PST.

## 2019-03-09 VITALS
RESPIRATION RATE: 20 BRPM | SYSTOLIC BLOOD PRESSURE: 103 MMHG | TEMPERATURE: 98 F | DIASTOLIC BLOOD PRESSURE: 47 MMHG | HEART RATE: 47 BPM

## 2019-03-09 LAB
ANION GAP SERPL CALC-SCNC: 15 MMOL/L — SIGNIFICANT CHANGE UP (ref 5–17)
BUN SERPL-MCNC: 32 MG/DL — HIGH (ref 8–20)
CALCIUM SERPL-MCNC: 8.3 MG/DL — LOW (ref 8.6–10.2)
CHLORIDE SERPL-SCNC: 106 MMOL/L — SIGNIFICANT CHANGE UP (ref 98–107)
CO2 SERPL-SCNC: 23 MMOL/L — SIGNIFICANT CHANGE UP (ref 22–29)
CREAT SERPL-MCNC: 1.46 MG/DL — HIGH (ref 0.5–1.3)
GLUCOSE SERPL-MCNC: 138 MG/DL — HIGH (ref 70–115)
HCT VFR BLD CALC: 39.4 % — LOW (ref 42–52)
HGB BLD-MCNC: 13 G/DL — LOW (ref 14–18)
MAGNESIUM SERPL-MCNC: 1.9 MG/DL — SIGNIFICANT CHANGE UP (ref 1.6–2.6)
MCHC RBC-ENTMCNC: 30.3 PG — SIGNIFICANT CHANGE UP (ref 27–31)
MCHC RBC-ENTMCNC: 33 G/DL — SIGNIFICANT CHANGE UP (ref 32–36)
MCV RBC AUTO: 91.8 FL — SIGNIFICANT CHANGE UP (ref 80–94)
PLATELET # BLD AUTO: 72 K/UL — LOW (ref 150–400)
POTASSIUM SERPL-MCNC: 3.5 MMOL/L — SIGNIFICANT CHANGE UP (ref 3.5–5.3)
POTASSIUM SERPL-SCNC: 3.5 MMOL/L — SIGNIFICANT CHANGE UP (ref 3.5–5.3)
RBC # BLD: 4.29 M/UL — LOW (ref 4.6–6.2)
RBC # FLD: 13.2 % — SIGNIFICANT CHANGE UP (ref 11–15.6)
SODIUM SERPL-SCNC: 144 MMOL/L — SIGNIFICANT CHANGE UP (ref 135–145)
WBC # BLD: 5.2 K/UL — SIGNIFICANT CHANGE UP (ref 4.8–10.8)
WBC # FLD AUTO: 5.2 K/UL — SIGNIFICANT CHANGE UP (ref 4.8–10.8)

## 2019-03-09 PROCEDURE — C1731: CPT

## 2019-03-09 PROCEDURE — 80048 BASIC METABOLIC PNL TOTAL CA: CPT

## 2019-03-09 PROCEDURE — 36575 REPAIR TUNNELED CV CATH: CPT

## 2019-03-09 PROCEDURE — 83735 ASSAY OF MAGNESIUM: CPT

## 2019-03-09 PROCEDURE — 85027 COMPLETE CBC AUTOMATED: CPT

## 2019-03-09 PROCEDURE — 93613 INTRACARDIAC EPHYS 3D MAPG: CPT

## 2019-03-09 PROCEDURE — 93662 INTRACARDIAC ECG (ICE): CPT

## 2019-03-09 PROCEDURE — 36415 COLL VENOUS BLD VENIPUNCTURE: CPT

## 2019-03-09 PROCEDURE — C1766: CPT

## 2019-03-09 PROCEDURE — 93657 TX L/R ATRIAL FIB ADDL: CPT

## 2019-03-09 PROCEDURE — 93005 ELECTROCARDIOGRAM TRACING: CPT

## 2019-03-09 PROCEDURE — C1732: CPT

## 2019-03-09 PROCEDURE — C1889: CPT

## 2019-03-09 PROCEDURE — 93623 PRGRMD STIMJ&PACG IV RX NFS: CPT

## 2019-03-09 PROCEDURE — C1894: CPT

## 2019-03-09 PROCEDURE — 36565 INSERT TUNNELED CV CATH: CPT

## 2019-03-09 PROCEDURE — 93010 ELECTROCARDIOGRAM REPORT: CPT

## 2019-03-09 RX ORDER — METOPROLOL TARTRATE 50 MG
25 TABLET ORAL EVERY 12 HOURS
Qty: 0 | Refills: 0 | Status: DISCONTINUED | OUTPATIENT
Start: 2019-03-09 | End: 2019-03-09

## 2019-03-09 RX ORDER — PANTOPRAZOLE SODIUM 20 MG/1
1 TABLET, DELAYED RELEASE ORAL
Qty: 60 | Refills: 0 | OUTPATIENT
Start: 2019-03-09

## 2019-03-09 RX ORDER — SUCRALFATE 1 G
10 TABLET ORAL
Qty: 280 | Refills: 0 | OUTPATIENT
Start: 2019-03-09

## 2019-03-09 RX ORDER — POTASSIUM CHLORIDE 20 MEQ
40 PACKET (EA) ORAL ONCE
Qty: 0 | Refills: 0 | Status: COMPLETED | OUTPATIENT
Start: 2019-03-09 | End: 2019-03-09

## 2019-03-09 RX ORDER — METOPROLOL TARTRATE 50 MG
1 TABLET ORAL
Qty: 180 | Refills: 0 | OUTPATIENT
Start: 2019-03-09

## 2019-03-09 RX ORDER — AMIODARONE HYDROCHLORIDE 400 MG/1
1 TABLET ORAL
Qty: 0 | Refills: 0 | COMMUNITY

## 2019-03-09 RX ORDER — METOPROLOL TARTRATE 50 MG
150 TABLET ORAL
Qty: 0 | Refills: 0 | COMMUNITY

## 2019-03-09 RX ORDER — FUROSEMIDE 40 MG
1 TABLET ORAL
Qty: 0 | Refills: 0 | COMMUNITY

## 2019-03-09 RX ADMIN — CLOPIDOGREL BISULFATE 75 MILLIGRAM(S): 75 TABLET, FILM COATED ORAL at 09:39

## 2019-03-09 RX ADMIN — Medication 40 MILLIEQUIVALENT(S): at 09:38

## 2019-03-09 RX ADMIN — Medication 1 GRAM(S): at 06:36

## 2019-03-09 RX ADMIN — Medication 20 MILLIGRAM(S): at 06:30

## 2019-03-09 RX ADMIN — LISINOPRIL 30 MILLIGRAM(S): 2.5 TABLET ORAL at 06:36

## 2019-03-09 RX ADMIN — APIXABAN 5 MILLIGRAM(S): 2.5 TABLET, FILM COATED ORAL at 00:47

## 2019-03-09 RX ADMIN — APIXABAN 5 MILLIGRAM(S): 2.5 TABLET, FILM COATED ORAL at 09:39

## 2019-03-09 RX ADMIN — Medication 81 MILLIGRAM(S): at 09:39

## 2019-03-09 RX ADMIN — Medication 25 MILLIGRAM(S): at 09:40

## 2019-03-09 RX ADMIN — PANTOPRAZOLE SODIUM 40 MILLIGRAM(S): 20 TABLET, DELAYED RELEASE ORAL at 06:36

## 2019-03-09 RX ADMIN — Medication 20 MILLIGRAM(S): at 00:47

## 2019-03-09 NOTE — PROGRESS NOTE ADULT - SUBJECTIVE AND OBJECTIVE BOX
58yMale s/p atrial fibrillation ablation ([facility]/PVI/posterior wall/CTI). B/L groin sutures removed without incident. B/L groin sites benign. Patient awake and alert without complaints. Denies chest pain, sob, palps. OOB and ambulating without complaints overnight.      H/o or current diagnosis of HF- ACEI/ARB contraindication unknown  H/o or current diagnosis of HF- no contraindication to ACEI/ARBs  H/o or current diagnosis of HF- ACEI/ARB contraindication unknown  Family history of coronary artery disease (Grandparent)  Family history of heart disease  No pertinent family history in first degree relatives  Handoff  RHETT treated with BiPAP  Uncomplicated asthma, unspecified asthma severity  Pure hypercholesterolemia  Coronary artery disease involving native coronary artery of native heart, angina presence unspecified  HTN (hypertension)  Atrial fibrillation  S/P tonsillectomy  History of umbilical hernia repair      acetaminophen   Tablet .. 650 milliGRAM(s) Oral every 6 hours PRN  ALPRAZolam 0.25 milliGRAM(s) Oral every 6 hours PRN  aluminum hydroxide/magnesium hydroxide/simethicone Suspension 30 milliLiter(s) Oral every 4 hours PRN  apixaban 5 milliGRAM(s) Oral every 12 hours  aspirin enteric coated 81 milliGRAM(s) Oral daily  atorvastatin 40 milliGRAM(s) Oral at bedtime  benzocaine 15 mG/menthol 3.6 mG (Sugar-Free) Lozenge 1 Lozenge Oral every 2 hours PRN  clopidogrel Tablet 75 milliGRAM(s) Oral daily  fentaNYL    Injectable 25 MICROGram(s) IV Push every 30 minutes PRN  furosemide    Tablet 20 milliGRAM(s) Oral daily  lisinopril 30 milliGRAM(s) Oral daily  losartan 100 milliGRAM(s) Oral daily  metoprolol succinate ER 50 milliGRAM(s) Oral every 12 hours  ondansetron Injectable 4 milliGRAM(s) IV Push every 6 hours PRN  oxyCODONE    IR 5 milliGRAM(s) Oral every 6 hours PRN  pantoprazole    Tablet 40 milliGRAM(s) Oral two times a day  potassium chloride    Tablet ER 40 milliEquivalent(s) Oral once  sucralfate suspension 1 Gram(s) Oral two times a day      T(C): 36.7 (03-09-19 @ 08:00), Max: 37 (03-08-19 @ 14:17)  HR: 47 (03-09-19 @ 08:00) (46 - 127)  BP: 103/47 (03-09-19 @ 08:00) (91/51 - 161/92)  RR: 20 (03-09-19 @ 08:00) (16 - 20)  SpO2: 92% (03-09-19 @ 06:00) (89% - 98%)  Wt(kg): --    Neuro A&O x 3  S1, S2 RRR  B/L CTA no wheeze or rhonchi  ABD non tender, non distended, BS +  B/L groins sheaths in place. B/L groins no hematoma, no bleeding, no ecchymosis. Pedal pulses palpable b/l LE, no edema.    Post EKG: NSB no acute st changes    A/P: 58y Male s/p atrial fibrillation ablation ([facility]/PVI/posterior wall/CTI).    1. Groin management discussed with patient  2. Resume all meds including eliquis. D/C amiodarone  3. Protonix 40mg BID x 2 weeks then daily x 4 weeks  4. Carafate bid x 2 weeks  5. Follow up with Dr. Meyer in 2 weeks or sooner if needed.  6. Stable for discharge 58yMale s/p atrial fibrillation ablation ([facility]/PVI/posterior wall/CTI). B/L groin sutures removed without incident. B/L groin sites benign. Patient awake and alert without complaints. Denies chest pain, sob, palps. OOB and ambulating without complaints overnight.      H/o or current diagnosis of HF- ACEI/ARB contraindication unknown  H/o or current diagnosis of HF- no contraindication to ACEI/ARBs  H/o or current diagnosis of HF- ACEI/ARB contraindication unknown  Family history of coronary artery disease (Grandparent)  Family history of heart disease  No pertinent family history in first degree relatives  Handoff  RHETT treated with BiPAP  Uncomplicated asthma, unspecified asthma severity  Pure hypercholesterolemia  Coronary artery disease involving native coronary artery of native heart, angina presence unspecified  HTN (hypertension)  Atrial fibrillation  S/P tonsillectomy  History of umbilical hernia repair      acetaminophen   Tablet .. 650 milliGRAM(s) Oral every 6 hours PRN  ALPRAZolam 0.25 milliGRAM(s) Oral every 6 hours PRN  aluminum hydroxide/magnesium hydroxide/simethicone Suspension 30 milliLiter(s) Oral every 4 hours PRN  apixaban 5 milliGRAM(s) Oral every 12 hours  aspirin enteric coated 81 milliGRAM(s) Oral daily  atorvastatin 40 milliGRAM(s) Oral at bedtime  benzocaine 15 mG/menthol 3.6 mG (Sugar-Free) Lozenge 1 Lozenge Oral every 2 hours PRN  clopidogrel Tablet 75 milliGRAM(s) Oral daily  fentaNYL    Injectable 25 MICROGram(s) IV Push every 30 minutes PRN  furosemide    Tablet 20 milliGRAM(s) Oral daily  lisinopril 30 milliGRAM(s) Oral daily  losartan 100 milliGRAM(s) Oral daily  metoprolol succinate ER 50 milliGRAM(s) Oral every 12 hours  ondansetron Injectable 4 milliGRAM(s) IV Push every 6 hours PRN  oxyCODONE    IR 5 milliGRAM(s) Oral every 6 hours PRN  pantoprazole    Tablet 40 milliGRAM(s) Oral two times a day  potassium chloride    Tablet ER 40 milliEquivalent(s) Oral once  sucralfate suspension 1 Gram(s) Oral two times a day      T(C): 36.7 (03-09-19 @ 08:00), Max: 37 (03-08-19 @ 14:17)  HR: 47 (03-09-19 @ 08:00) (46 - 127)  BP: 103/47 (03-09-19 @ 08:00) (91/51 - 161/92)  RR: 20 (03-09-19 @ 08:00) (16 - 20)  SpO2: 92% (03-09-19 @ 06:00) (89% - 98%)  Wt(kg): --    Neuro A&O x 3  S1, S2 RRR  B/L CTA no wheeze or rhonchi  ABD non tender, non distended, BS +  B/L groins sheaths in place. B/L groins no hematoma, no bleeding, no ecchymosis. Pedal pulses palpable b/l LE, no edema.    Post EKG: NSB no acute st changes    A/P: 58y Male s/p atrial fibrillation ablation ([facility]/PVI/posterior wall/CTI).    1. Groin management discussed with patient  2. Resume all meds including eliquis. D/C amiodarone  3. Protonix 40mg BID x 2 weeks then daily x 4 weeks  4. Carafate bid x 2 weeks  5. Follow up with Dr. Meyer in 2 weeks or sooner if needed.  6. Stable for discharge  7. Decrease Toprol xl 25mg po bid

## 2019-03-13 ENCOUNTER — NON-APPOINTMENT (OUTPATIENT)
Age: 59
End: 2019-03-13

## 2019-03-13 ENCOUNTER — APPOINTMENT (OUTPATIENT)
Dept: CARDIOLOGY | Facility: CLINIC | Age: 59
End: 2019-03-13
Payer: COMMERCIAL

## 2019-03-13 VITALS
HEIGHT: 71 IN | DIASTOLIC BLOOD PRESSURE: 85 MMHG | BODY MASS INDEX: 32.34 KG/M2 | WEIGHT: 231 LBS | RESPIRATION RATE: 16 BRPM | HEART RATE: 56 BPM | SYSTOLIC BLOOD PRESSURE: 155 MMHG

## 2019-03-13 PROCEDURE — 99214 OFFICE O/P EST MOD 30 MIN: CPT

## 2019-03-13 PROCEDURE — 93000 ELECTROCARDIOGRAM COMPLETE: CPT

## 2019-03-13 RX ORDER — ASPIRIN ENTERIC COATED TABLETS 81 MG 81 MG/1
81 TABLET, DELAYED RELEASE ORAL DAILY
Refills: 0 | Status: DISCONTINUED | COMMUNITY
End: 2019-03-13

## 2019-03-13 RX ORDER — AMIODARONE HYDROCHLORIDE 200 MG/1
200 TABLET ORAL DAILY
Qty: 90 | Refills: 0 | Status: DISCONTINUED | COMMUNITY
Start: 1900-01-01 | End: 2019-03-13

## 2019-03-14 NOTE — REASON FOR VISIT
[FreeTextEntry1] : 58-year-old presents here for cardiac reevaluation status-post pulmonary vein and tricuspid valve isthmus ablation for rapid a-fib with difficulty controlling ventricular rates and secondary heart failure with left ventricular dysfunction. \par \par The patient initially felt a bit short of breath in the day or so after his procedure.  Subsequently was placed on furosemide 20 mg increased to b.i.d. with improvement.  \par \par He is a bit concerned about ecchymoses in the groin area.  Also has concerns about management in this point in time. \par \par Reports his blood pressure remains elevated 130-170 systolic/ diastolic.  \par \par Blood pressure machine showing his heart rate is in the 50s.  \par \par His other history includes that of:\par 1.  Prior coronary artery disease and stenting. \par 2.  A dilated cardiomyopathy with hypertensive heart disease that seemed to improve with treatment of hypertension and sleep apnea and restoration in sinus rhythm in the past. \par 3.  Obstructive sleep apnea which he has been somewhat noncompliant with treatment until recently. \par 4.  Obesity. \par 5.  Hypertension not always so well controlled. \par 6.  He also has a history of QT prolongation with excessive amiodarone use.  The amiodarone has been discontinued.   Diuretics have been started.  \par

## 2019-03-14 NOTE — ASSESSMENT
[FreeTextEntry1] : ECG: Sinus bradycardia 56. Diffuse T-wave abnormality. Motion artifact.\par \par Hospital data:\par [facility] 2/8/19:\par Severe global left ventricular systolic dysfunction. LVEF 20-25%.\par RV normal size with severely reduced function.\par Severe left atrial enlargement.\par Moderate right atrial enlargement.\par Mild mitral and tricuspid insufficiency.\par \par BNP 4439\par Creatinine as high as 1.38\par \par Impression:\par 1.S/P repeat DCC 2/8/19. Status post pulmonary vein and tricuspid valve isthmus ablation for atrial fibrillation and flutter. 3/8/19\par 2. Substantial ecchymosis of the abdomen groin and thighs and no significant hematoma formation.\par 2. Severe biventricular systolic dysfunction, possibly multifactorial.\par 3. Hypertension is still suboptimally controlled\par 4.No signs of angina despite a prior history of coronary artery disease and LAD stenting.\par 5. Reportedly had weight gain but now seems to be back to his baseline.\par 6. Obstructive sleep apnea which is severe and just now being treated.\par \par \par Plan:\par Plan:\par 1. Will add nifedipine XL 60 mg medical regimen.\par 2. Metabolic panel in the next 24 hours.\par 3. Curb sodium and fluid intake\par 4. Begin exercising next week.\par 5. Aggressive dietary program.\par 6. Continued use of CPAP regularly.\par 7. Offices in one month

## 2019-03-14 NOTE — PHYSICAL EXAM
[FreeTextEntry1] :                    Well appearing and nourished with no obvious deformities or distress.\par \par Eyes: \par No conjunctival injection and no xanthelasmas.\par HEENT: \par Normocephalic.Normal oral mucosa. No pallor or cyanosis\par Neck: \par No jugular venous distension. with normal A and V wave forms. No palpable adenopathy.\par Cardiovascular: \par regular S1, S2 and a grade 1/6 systolic murmur. Distal arterial pulses are normal. No significant peripheral edema.\par Pulmonary: \par Lungs are clear to auscultation and percussion. Normal respiratory pattern without any accessory muscle use\par Abdomen: \par Soft, obese,non-tender ; no palpable organomegaly or masses.\par Extremities:\par No digital clubbing, cyanosis or ischemic changes.\par Skin: substantial ecchymosis of the lower abdomen and groin area without any hematoma or collection\par No skin lesions, rashes, ulcers or xanthomas.\par Psychiatric: \par Alert and oriented to person, place and time. Appropriate mood and affect.

## 2019-03-15 ENCOUNTER — RX RENEWAL (OUTPATIENT)
Age: 59
End: 2019-03-15

## 2019-04-17 ENCOUNTER — NON-APPOINTMENT (OUTPATIENT)
Age: 59
End: 2019-04-17

## 2019-04-17 ENCOUNTER — APPOINTMENT (OUTPATIENT)
Dept: CARDIOLOGY | Facility: CLINIC | Age: 59
End: 2019-04-17
Payer: COMMERCIAL

## 2019-04-17 VITALS
RESPIRATION RATE: 16 BRPM | SYSTOLIC BLOOD PRESSURE: 130 MMHG | WEIGHT: 218 LBS | BODY MASS INDEX: 30.52 KG/M2 | HEIGHT: 71 IN | HEART RATE: 90 BPM | DIASTOLIC BLOOD PRESSURE: 75 MMHG

## 2019-04-17 PROCEDURE — 93000 ELECTROCARDIOGRAM COMPLETE: CPT

## 2019-04-17 PROCEDURE — 99214 OFFICE O/P EST MOD 30 MIN: CPT

## 2019-04-17 NOTE — ASSESSMENT
[FreeTextEntry1] : ECG: Sinus bradycardia 90. Diffuse T-wave abnormality.\par \par Hospital data:\par [facility]  2/8/19:\par Severe global left ventricular systolic dysfunction. LVEF 20-25%.\par RV normal size with severely reduced function.\par Severe left atrial enlargement.\par Moderate right atrial enlargement.\par Mild mitral and tricuspid insufficiency.\par \par BNP 4439\par Creatinine as high as 1.38\par \par Impression:\par 1.S/P repeat DCC 2/8/19. Status post pulmonary vein and tricuspid valve isthmus ablation for atrial fibrillation and flutter. 3/8/19\par 2. Substantial weight loss by intention.\par 2. Severe biventricular systolic dysfunction, possibly multifactorial.\par 3. Hypertension is NOW controlled with addition of Nifedipine\par 4.No signs of angina despite a prior history of coronary artery disease and LAD stenting.\par 5. No HF on lower dose lasix.\par 6. Obstructive sleep apnea which is severe and just now being treated.\par \par \par Plan:\par 1. Continue the  nifedipine XL 60 mg medical regimen.\par 2. May begin regular symptom limited gradual exercise program.\par 3. Curb sodium and fluid intake\par 4. Continued use of CPAP regularly\par 5. Aggressive dietary program.\par 6. Repeat echo in 4 months\par 7. Offices in one month

## 2019-04-17 NOTE — REASON FOR VISIT
[FreeTextEntry1] : 58-year-old presents here for cardiac reevaluation status-post pulmonary vein and tricuspid valve isthmus ablation for rapid a-fib ( 3/8/19)  with difficulty controlling ventricular rates and secondary heart failure with left ventricular dysfunction. \par \par The patient initially felt a bit short of breath in the day or so after his procedure.  Subsequently was placed on furosemide 20 mg increased to b.i.d. with improvement.  He has reduced this to just once daily\par \par Reports his blood pressure now is controlled at130 systolic/75 mm hg diastolic.  \par \par Blood pressure machine showing his heart rate is in the 50s.  \par \par His other history includes that of:\par 1.  Prior coronary artery disease and stenting. \par 2.  A dilated cardiomyopathy with hypertensive heart disease that seemed to improve with treatment of hypertension and sleep apnea and restoration in sinus rhythm in the past. \par 3.  Obstructive sleep apnea which he had been somewhat noncompliant with treatment until recently. \par 4.  Obesity, now actively dieting and losing weight\par 5.  Hypertension not always so well controlled. \par 6.  He also has a history of QT prolongation with excessive amiodarone use.  The amiodarone has been discontinued.  \par

## 2019-06-03 ENCOUNTER — RX RENEWAL (OUTPATIENT)
Age: 59
End: 2019-06-03

## 2019-06-04 ENCOUNTER — RX RENEWAL (OUTPATIENT)
Age: 59
End: 2019-06-04

## 2019-06-18 NOTE — ED ADULT NURSE NOTE - NSFALLRSKOUTCOME_ED_ALL_ED
Universal Safety Interventions Burow's Advancement Flap Text: Given the location of the defect and the proximity to free margins a Burow's advancement flap was deemed most appropriate.  Using a sterile surgical marker, the appropriate advancement flap was drawn incorporating the defect and placing the expected incisions within the relaxed skin tension lines where possible.    The area thus outlined was incised deep to adipose tissue with a #15c scalpel blade.  The skin margins were undermined to an appropriate distance in all directions utilizing iris scissors.

## 2019-06-20 NOTE — CONSULT NOTE ADULT - ASSESSMENT
Problem: Potential for Falls  Goal: Patient will remain free of falls  Description  INTERVENTIONS:  - Assess patient frequently for physical needs  -  Identify cognitive and physical deficits and behaviors that affect risk of falls    -  [place] fall precautions as indicated by assessment   - Educate patient/family on patient safety including physical limitations  - Instruct patient to call for assistance with activity based on assessment  - Modify environment to reduce risk of injury  - Consider OT/PT consult to assist with strengthening/mobility  Outcome: Progressing     Problem: Prexisting or High Potential for Compromised Skin Integrity  Goal: Skin integrity is maintained or improved  Description  INTERVENTIONS:  - Identify patients at risk for skin breakdown  - Assess and monitor skin integrity  - Assess and monitor nutrition and hydration status  - Monitor labs (i e  albumin)  - Assess for incontinence   - Turn and reposition patient  - Assist with mobility/ambulation  - Relieve pressure over bony prominences  - Avoid friction and shearing  - Provide appropriate hygiene as needed including keeping skin clean and dry  - Evaluate need for skin moisturizer/barrier cream  - Collaborate with interdisciplinary team (i e  Nutrition, Rehabilitation, etc )   - Patient/family teaching  Outcome: Progressing     Problem: SAFETY ADULT  Goal: Maintain or return to baseline ADL function  Description  INTERVENTIONS:  -  Assess patient's ability to carry out ADLs; assess patient's baseline for ADL function and identify physical deficits which impact ability to perform ADLs (bathing, care of mouth/teeth, toileting, grooming, dressing, etc )  - Assess/evaluate cause of self-care deficits   - Assess range of motion  - Assess patient's mobility; develop plan if impaired  - Assess patient's need for assistive devices and provide as appropriate  - Encourage maximum independence but intervene and supervise when necessary  ¯ Assess for home care needs following discharge   ¯ Request OT consult to assist with ADL evaluation and planning for discharge  ¯ Provide patient education as appropriate   Outcome: Progressing  Goal: Maintain or return mobility status to optimal level  Description  INTERVENTIONS:  - Assess patient's baseline mobility status (ambulation, transfers, stairs, etc )    - Identify cognitive and physical deficits and behaviors that affect mobility  - Identify mobility aids required to assist with transfers and/or ambulation (gait belt, sit-to-stand, lift, walker, cane, etc )  - [place] fall precautions as indicated by assessment  - Record patient progress and toleration of activity level on Mobility SBAR; progress patient to next Phase/Stage  - Instruct patient to call for assistance with activity based on assessment  - Request Rehabilitation consult to assist with strengthening/weightbearing, etc   Outcome: Progressing     Problem: DISCHARGE PLANNING  Goal: Discharge to home or other facility with appropriate resources  Description  INTERVENTIONS:  - Identify barriers to discharge w/patient and caregiver  - Arrange for needed discharge resources and transportation as appropriate  - Identify discharge learning needs (meds, wound care, etc )  - Arrange for interpretive services to assist at discharge as needed  - Refer to Case Management Department for coordinating discharge planning if the patient needs post-hospital services based on physician/advanced practitioner order or complex needs related to functional status, cognitive ability, or social support system  Outcome: Progressing     Problem: Knowledge Deficit  Goal: Patient/family/caregiver demonstrates understanding of disease process, treatment plan, medications, and discharge instructions  Description  Complete learning assessment and assess knowledge base    Interventions:  - Provide teaching at level of understanding  - Provide teaching via preferred learning methods  Outcome: Progressing     Problem: NEUROSENSORY - ADULT  Goal: Achieves stable or improved neurological status  Description  INTERVENTIONS  - Monitor and report changes in neurological status  - Initiate measures to prevent increased intracranial pressure  - Maintain blood pressure and fluid volume within ordered parameters to optimize cerebral perfusion  - Monitor temperature, glucose, and sodium or any other associated labs   Initiate appropriate interventions as ordered  - Monitor for seizure activity   - Administer anti-seizure medications as ordered  Outcome: Progressing  Goal: Absence of seizures  Description  INTERVENTIONS  - Monitor for seizure activity  - Administer anti-seizure medications as ordered  - Monitor neurological status  Outcome: Progressing  Goal: Remains free of injury related to seizures activity  Description  INTERVENTIONS  - Maintain airway, patient safety  and administer oxygen as ordered  - Monitor patient for seizure activity, document and report duration and description of seizure to physician/advanced practitioner  - If seizure occurs,  ensure patient safety during seizure  - Reorient patient post seizure  - Seizure pads on all 4 side rails  - Instruct patient/family to notify RN of any seizure activity including if an aura is experienced  - Instruct patient/family to call for assistance with activity based on nursing assessment  - Administer anti-seizure medications as ordered   Outcome: Progressing  Goal: Achieves maximal functionality and self care  Description  INTERVENTIONS  - Monitor swallowing and airway patency with patient fatigue and changes in neurological status  - Encourage and assist patient to increase activity and self care with guidance from rehab services  - Encourage visually impaired, hearing impaired and aphasic patients to use assistive/communication devices  Outcome: Progressing     Problem: Nutrition/Hydration-ADULT  Goal: Nutrient/Hydration intake appropriate for improving, restoring or maintaining nutritional needs  Description  Monitor and assess patient's nutrition/hydration status for malnutrition (ex- brittle hair, bruises, dry skin, pale skin and conjunctiva, muscle wasting, smooth red tongue, and disorientation)  Collaborate with interdisciplinary team and initiate plan and interventions as ordered  Monitor patient's weight and dietary intake as ordered or per policy  Utilize nutrition screening tool and intervene per policy  Determine patient's food preferences and provide high-protein, high-caloric foods as appropriate       INTERVENTIONS:  - Monitor oral intake, urinary output, labs, and treatment plans  - Assess nutrition and hydration status and recommend course of action  - Evaluate amount of meals eaten  - Assist patient with eating if necessary   - Allow adequate time for meals  - Recommend/ encourage appropriate diets, oral nutritional supplements, and vitamin/mineral supplements  - Provide specific nutrition/hydration education as appropriate  - Include patient/family/caregiver in decisions related to nutrition   Outcome: Progressing 59 yo obese male with a pmhx HTN, HLD, [facility] dilated cardiomyopathy (EF 30%) dx 12/2016 that has improved to normal as of 6/8/17 TTE, RHETT not on CPAP, and pAF on Eliquis who now presents with 3 days of pAF/flutter with associated RVR.     Pt's CHADS-VASC is at least 2 (3 of CHF present but current EF unknown) but states he's been compliant with Eliquis and hasn't missed a dose in the past 30 days. Despite his adherence, would still wish to pursue DCCV under [facility] guidance to exclude atrial thrombus (would have considered to not perform CODY if CHADS-VASC = 0 or 1). Pt in the long term would benefit from catheter ablation of both his atrial fibrillation and fluttter.     Recommendations:  1. Would d/c diltiazem gtt due to limited efficacy (rates still in 130s) and possible harm (unknown if EF has dropped again or not)  2. Schedule CODY/DCCV for tomorrow (NPO AMN)  3. Continue uninterrupted Eliquis  4. F/u in office in 2 weeks to discuss AF ablation vs. AAD for rhythm control

## 2019-10-29 ENCOUNTER — NON-APPOINTMENT (OUTPATIENT)
Age: 59
End: 2019-10-29

## 2019-10-29 ENCOUNTER — APPOINTMENT (OUTPATIENT)
Dept: CARDIOLOGY | Facility: CLINIC | Age: 59
End: 2019-10-29
Payer: COMMERCIAL

## 2019-10-29 VITALS
BODY MASS INDEX: 33.46 KG/M2 | HEART RATE: 68 BPM | OXYGEN SATURATION: 97 % | RESPIRATION RATE: 16 BRPM | HEIGHT: 71 IN | SYSTOLIC BLOOD PRESSURE: 122 MMHG | WEIGHT: 239 LBS | DIASTOLIC BLOOD PRESSURE: 80 MMHG

## 2019-10-29 DIAGNOSIS — I48.1 PERSISTENT ATRIAL FIBRILLATION: ICD-10-CM

## 2019-10-29 PROCEDURE — 93000 ELECTROCARDIOGRAM COMPLETE: CPT

## 2019-10-29 PROCEDURE — 99214 OFFICE O/P EST MOD 30 MIN: CPT

## 2019-10-29 NOTE — HISTORY OF PRESENT ILLNESS
[FreeTextEntry1] : Denies any chest pain, SOB, edema or palps. There are no exertional complaints. \par \par During has last office visit we was advised to have a repeat echo and follow up in one month which he did not. \par He has stopped using his CPAP.\par His exercise is very intermittent.\par He admits to dietary and discretion.\par Has not experienced any palpitations consistent with recurrent atrial fibrillation.\par There is been no increasing shortness of breath or r edema.

## 2019-10-29 NOTE — REASON FOR VISIT
[FreeTextEntry1] : 58-year-old presents here for cardiac reevaluation. His medical history includes : \par \par 1.PAF\par 2.RHETT\par 3.Obesity\par 4. Dilated cardiomyopathy\par 5.H/O heart artery stent\par 6. Hyperlipidemia\par 7. HTN\par \par On 3/8/19 he successful had a pulmonary vein and tricuspid valve isthmus ablation for rapid a-fib with difficulty controlling ventricular rates and secondary heart failure with left ventricular dysfunction. \par  Subsequently was placed on furosemide 20 mg  BID after his procedure for SOB. This is now taken once a day. \par \par Admits to not using his CPAP because he "does not snore anymore" and feels well. \par \par Home blood pressures running 120/80s\par \par States he is compliant with his cardiac medications, thinks Nifedipine ER is making him shaky.\par \par \par \par His other history includes that of:\par 1.  Prior coronary artery disease and stenting. \par 2.  A dilated cardiomyopathy with hypertensive heart disease that seemed to improve with treatment of hypertension and sleep apnea and restoration in sinus rhythm in the past. \par 3.  Obstructive sleep apnea which he had been somewhat noncompliant with treatment until recently. \par 4.  Obesity, now actively dieting and losing weight\par 5.  Hypertension not always so well controlled. \par 6.  He also has a history of QT prolongation with excessive amiodarone use.  The amiodarone has been discontinued.  \par

## 2019-10-29 NOTE — ASSESSMENT
[FreeTextEntry1] : ECG: Sinus rhythm at 68 BPM. Non specific T T-wave abnormality.\par \par Lab data 10/23/19\par Chol. 135\par LDL    65\par HDL   48\par A1C   5.5\par Alk. Phos. 122\par Creat. 1.15\par Platelets 90(88 in Feb 2019)\par WBC 11 ( 10.1 Feb. 2019)\par \par \par Hospital data:\par [facility]  2/8/19:\par Severe global left ventricular systolic dysfunction. LVEF 20-25%.\par RV normal size with severely reduced function.\par Severe left atrial enlargement.\par Moderate right atrial enlargement.\par Mild mitral and tricuspid insufficiency.\par \par BNP 4439\par Creatinine as high as 1.38\par \par Impression:\par 1.S/P repeat DCC 2/8/19. Status post pulmonary vein and tricuspid valve isthmus ablation for atrial fibrillation and        flutter. 3/8/19. SR on ECG today. No clinical recurrence of which he was aware\par 2. 21 lb weight gain since April, BMI now 33, he is aware.\par 2. Severe biventricular systolic dysfunction, possibly multifactorial. \par 3. Hypertension  continues to be  controlled with addition of Nifedipine, today 122/80 and apparently controlled at home \par 4.No signs of angina despite a prior history of coronary artery disease and LAD stenting.\par 5. No HF on Lasix QD. Denies SOB, no edema on exam.\par 6. Untreated Obstructive sleep apnea. he decided to stop CPAP bec he was no longer snoring\par 7. Low platelets which he states is chronic and saw hematology years ago. Lipid panel WNL. \par \par \par Plan:\par 1. Continue the  nifedipine XL 60 mg medical regimen.\par 2. Continue to exercise as tolerated with diet modification. Must lose the 20 lbs\par 4. F/U with pulm. Restart CPAP treatment.\par 5. F/U with hematology\par 6. Repeat echo now\par \par Clinical follow up after testing is completed. \par

## 2019-11-06 NOTE — ED STATDOCS - NS ED ATTENDING STATEMENT MOD
CC: f/u persistent hypoglycemia, edema  INTERVAL HPI/OVERNIGHT EVENTS: Patient seen and examined at bedside. No overnight events. Patient has no complaints today and states that he is feeling better and slept well last night. He is eager to start rehab and get out of the hospital. Denies chest pain, sob, n/v/d.     REVIEW OF SYSTEMS:  CONSTITUTIONAL: No fever, weight loss, or fatigue  EYES: No eye pain, visual disturbances, or discharge  ENT:  No difficulty hearing, tinnitus, vertigo; No sinus or throat pain  NECK: No pain or stiffness  RESPIRATORY: No cough, wheezing, chills or hemoptysis; No shortness of breath  CARDIOVASCULAR: No chest pain, palpitations, dizziness, or leg swelling  GASTROINTESTINAL: No abdominal or epigastric pain. No nausea, vomiting, or hematemesis; No diarrhea or constipation.  GENITOURINARY: No dysuria, frequency, hematuria, or incontinence  NEUROLOGICAL: No headaches, memory loss, loss of strength, numbness, or tremors  SKIN: No itching, burning, rashes, or lesions   MUSCULOSKELETAL: No joint pain or swelling; No muscle, back, or extremity pain    Allergies:  codeine (Anaphylaxis)    Intolerances:  NO  RED MEAT (Unknown)    HEALTH ISSUES - PROBLEM Dx:  Hepatic encephalopathy: Hepatic encephalopathy  Cirrhosis, non-alcoholic: Cirrhosis, non-alcoholic  Gastrointestinal hemorrhage, unspecified gastrointestinal hemorrhage type: Gastrointestinal hemorrhage, unspecified gastrointestinal hemorrhage type  Cirrhosis: Cirrhosis    PAST MEDICAL & SURGICAL HISTORY:  GIB (gastrointestinal bleeding)  GERD with esophagitis: Gastritis &amp; Non Bleeding Ulcers  Hepatic encephalopathy  Obesity  Fatty liver disease, nonalcoholic  Renal stones: 25 years ago  Hypertension  Neuropathy  Hypercholesteremia  Diabetes  S/P cholecystectomy    VITAL SIGNS:  T(C): 36.6 (11-06-19 @ 08:00), Max: 36.6 (11-05-19 @ 16:43)  HR: 79 (11-06-19 @ 08:00) (79 - 83)  BP: 102/52 (11-06-19 @ 09:30) (100/55 - 116/67)  RR: 18 (11-06-19 @ 08:00) (18 - 18)  SpO2: 94% (11-06-19 @ 08:00) (91% - 95%)  Wt(kg): --Vital Signs Last 24 Hrs  T(C): 36.6 (06 Nov 2019 08:00), Max: 36.6 (05 Nov 2019 16:43)  T(F): 97.9 (06 Nov 2019 08:00), Max: 97.9 (06 Nov 2019 08:00)  HR: 79 (06 Nov 2019 08:00) (79 - 83)  BP: 102/52 (06 Nov 2019 09:30) (100/55 - 116/67)  BP(mean): --  RR: 18 (06 Nov 2019 08:00) (18 - 18)  SpO2: 94% (06 Nov 2019 08:00) (91% - 95%)    PHYSICAL EXAM:  GENERAL: Pt lying in bed in NAD, looks more comfortable than previous  HEAD:  Atraumatic, Normocephalic  EYES: EOMI, PERRL, conjunctiva and sclera clear  ENT: Moist mucous membranes  NECK: Supple, No JVD  CHEST/LUNG: Clear to auscultation bilaterally; No rales, rhonchi, wheezing, or rubs. Unlabored respirations  HEART: Regular rate and rhythm; No murmurs, rubs, or gallops  ABDOMEN: Bowel sounds present; Soft, Nontender, Distended. No guarding or rigidity. Scrotal edema improving. Prima fit in place.   EXTREMITIES: B/L LE 2+ Pitting edema- improving, peripheral pulses intact, No clubbing or cyanosis  NERVOUS SYSTEM:  Alert & Oriented X3, speech clear, FROM x 4 extremities. No deficits   SKIN: No rashes or lesions    LABS:                        9.0    4.72  )-----------( 64       ( 06 Nov 2019 08:29 )             26.6     11-06    132<L>  |  88<L>  |  37.0<H>  ----------------------------<  194<H>  3.8   |  33.0<H>  |  1.20    Ca    9.4      06 Nov 2019 08:29  Phos  2.8     11-06  Mg     1.5     11-06        CAPILLARY BLOOD GLUCOSE  POCT Blood Glucose.: 185 mg/dL (06 Nov 2019 11:41)  POCT Blood Glucose.: 179 mg/dL (06 Nov 2019 07:49)  POCT Blood Glucose.: 235 mg/dL (05 Nov 2019 22:06)  POCT Blood Glucose.: 233 mg/dL (05 Nov 2019 16:49) Attending Only

## 2019-11-18 ENCOUNTER — APPOINTMENT (OUTPATIENT)
Dept: CARDIOLOGY | Facility: CLINIC | Age: 59
End: 2019-11-18
Payer: COMMERCIAL

## 2019-11-18 PROCEDURE — 93306 TTE W/DOPPLER COMPLETE: CPT | Mod: 26,59

## 2019-11-18 PROCEDURE — 93350 STRESS TTE ONLY: CPT | Mod: 26

## 2019-11-18 PROCEDURE — 93321 DOPPLER ECHO F-UP/LMTD STD: CPT | Mod: 26,59

## 2019-11-18 PROCEDURE — 93325 DOPPLER ECHO COLOR FLOW MAPG: CPT | Mod: 26,59

## 2019-11-18 RX ORDER — PERFLUTREN 6.52 MG/ML
6.52 INJECTION, SUSPENSION INTRAVENOUS
Qty: 2 | Refills: 0 | Status: COMPLETED | OUTPATIENT
Start: 2019-11-18

## 2019-11-18 RX ADMIN — PERFLUTREN MG/ML: 6.52 INJECTION, SUSPENSION INTRAVENOUS at 00:00

## 2019-12-05 ENCOUNTER — MEDICATION RENEWAL (OUTPATIENT)
Age: 59
End: 2019-12-05

## 2019-12-05 ENCOUNTER — RX RENEWAL (OUTPATIENT)
Age: 59
End: 2019-12-05

## 2020-02-07 NOTE — ASSESSMENT
[FreeTextEntry1] : ECG: Sinus rhythm at 68 BPM. Non specific T T-wave abnormality.\par \par \par Echocardiogram 11/18/2019:\par EF 55%\par The basal and mid inferior wall is mildly hypokinetic\par Mildly dilated left atrium\par Mild mitral annular calcification\par Mild aortic valve sclerosis w/o stenosis\par Mild dilation of aortic root\par \par Lab data 10/23/19\par Chol. 135\par LDL    65\par HDL   48\par A1C   5.5\par Alk. Phos. 122\par Creat. 1.15\par Platelets 90(88 in Feb 2019)\par WBC 11 ( 10.1 Feb. 2019)\par \par \par Hospital data:\par [facility]  2/8/19:\par Severe global left ventricular systolic dysfunction. LVEF 20-25%.\par RV normal size with severely reduced function.\par Severe left atrial enlargement.\par Moderate right atrial enlargement.\par Mild mitral and tricuspid insufficiency.\par \par BNP 4439\par Creatinine as high as 1.38\par \par Impression:\par 1.S/P repeat DCC 2/8/19. Status post pulmonary vein and tricuspid valve isthmus ablation for atrial fibrillation and        flutter. 3/8/19. SR on ECG today. No clinical recurrence of which he was aware\par 2. 21 lb weight gain since April, BMI now 33, he is aware.\par 2. Severe biventricular systolic dysfunction, possibly multifactorial. \par 3. Hypertension  continues to be  controlled with addition of Nifedipine, today 122/80 and apparently controlled at home \par 4.No signs of angina despite a prior history of coronary artery disease and LAD stenting.\par 5. No HF on Lasix QD. Denies SOB, no edema on exam.\par 6. Untreated Obstructive sleep apnea. he decided to stop CPAP bec he was no longer snoring\par 7. Low platelets which he states is chronic and saw hematology years ago. Lipid panel WNL. \par \par \par Plan:\par 1. Continue the  nifedipine XL 60 mg medical regimen.\par 2. Continue to exercise as tolerated with diet modification. Must lose the 20 lbs\par 4. F/U with pulm. Restart CPAP treatment.\par 5. F/U with hematology\par 6. Repeat echo now\par \par Clinical follow up after testing is completed. \par

## 2020-02-10 ENCOUNTER — APPOINTMENT (OUTPATIENT)
Dept: CARDIOLOGY | Facility: CLINIC | Age: 60
End: 2020-02-10

## 2020-03-04 ENCOUNTER — RX RENEWAL (OUTPATIENT)
Age: 60
End: 2020-03-04

## 2020-03-11 ENCOUNTER — TRANSCRIPTION ENCOUNTER (OUTPATIENT)
Age: 60
End: 2020-03-11

## 2020-03-16 ENCOUNTER — APPOINTMENT (OUTPATIENT)
Dept: CARDIOLOGY | Facility: CLINIC | Age: 60
End: 2020-03-16

## 2020-03-27 ENCOUNTER — LABORATORY RESULT (OUTPATIENT)
Age: 60
End: 2020-03-27

## 2020-03-27 ENCOUNTER — APPOINTMENT (OUTPATIENT)
Dept: DISASTER EMERGENCY | Facility: CLINIC | Age: 60
End: 2020-03-27
Payer: COMMERCIAL

## 2020-03-27 VITALS — TEMPERATURE: 99 F

## 2020-03-27 DIAGNOSIS — Z00.00 ENCOUNTER FOR GENERAL ADULT MEDICAL EXAMINATION W/OUT ABNORMAL FINDINGS: ICD-10-CM

## 2020-03-27 PROCEDURE — 99202 OFFICE O/P NEW SF 15 MIN: CPT

## 2020-03-27 RX ORDER — FLUOCINONIDE 0.5 MG/G
0.05 GEL TOPICAL
Qty: 1 | Refills: 2 | Status: DISCONTINUED | COMMUNITY
Start: 2018-10-31 | End: 2020-03-27

## 2020-03-27 RX ORDER — FUROSEMIDE 20 MG/1
20 TABLET ORAL TWICE DAILY
Qty: 30 | Refills: 1 | Status: DISCONTINUED | COMMUNITY
Start: 2019-02-28 | End: 2020-03-27

## 2020-03-27 NOTE — PLAN
[FreeTextEntry1] : Stated that testing results may take between 5-7 days to become available. The lab will contact the patient with the results. If the test is positive advised MARI to continue home isolation until they they are completely well with no fever and it has been at least 14 days since last positive test. If the test is negative. They will be able to stop home isolation and resume standard precautions. Referred further questions to [phone]\par \par Recommended precautionary steps from now until 14 days from when they returned from travel date or from last known possible contact: Do not go to work, school or public areas. Avoid using public transportation, airports, taxis and ride sharing. As much as possible separate themselves from other people at home. Wear supplied mask when ever they are around other people. Reschedule non-urgent medical appointments to another date. Wash hands with soap and water for at least 20 seconds or use an alcohol based  that contains 60-95% alcohol. covering all surfaces until dry. Cover mouth and nose with tissue when they cough or sneeze, throw tissue in trans and wash hands. Avoid touching eyes, mouth and nose. with hands. Avoid sharing personal items such as dishes, drinking glasses, cups, eating utensils, towels and bedding with other people. Clean and disinfect all high touch surfaces.\par \par Advised MARI of signs of decompensation s to be watched for at home and seek immediate medical care if they occur such as: chest pain, severe shortness of breath. worsening shortness of breath,with minimal exertion, new or worsening wheezing, dizziness on standing or generally feeling worse. \par \par MARI Voiced understanding.

## 2020-03-27 NOTE — HISTORY OF PRESENT ILLNESS
[FreeTextEntry8] : MARI is a 59 year male who is here for COVID 19 testing. MARI had symptoms such as fever, myalgias, severe cough. Resolving. MARI does/not have high risk factors such as travel to endemic areas, but he had direct contact with someone who tested positive with COVD19 worked in room all day. He does have high risk comorbidities such as >49 y/o, hypertension, heart disease, lung disease- asthma, but is not taking immune suppression or is currently taking medications that suppress the immune system.\par \par No physical exam was performed except as noted below.\par

## 2020-05-04 ENCOUNTER — RX RENEWAL (OUTPATIENT)
Age: 60
End: 2020-05-04

## 2020-05-27 ENCOUNTER — RX RENEWAL (OUTPATIENT)
Age: 60
End: 2020-05-27

## 2020-05-27 PROBLEM — Z00.00 ENCOUNTER FOR PREVENTIVE HEALTH EXAMINATION: Status: ACTIVE | Noted: 2017-01-05

## 2020-05-27 RX ORDER — CLOPIDOGREL BISULFATE 75 MG/1
75 TABLET, FILM COATED ORAL DAILY
Qty: 90 | Refills: 0 | Status: ACTIVE | COMMUNITY
Start: 2019-03-15 | End: 1900-01-01

## 2020-06-10 DIAGNOSIS — Z20.828 CONTACT WITH AND (SUSPECTED) EXPOSURE TO OTHER VIRAL COMMUNICABLE DISEASES: ICD-10-CM

## 2020-06-22 ENCOUNTER — RX RENEWAL (OUTPATIENT)
Age: 60
End: 2020-06-22

## 2020-07-13 RX ORDER — NIFEDIPINE 60 MG/1
60 TABLET, EXTENDED RELEASE ORAL DAILY
Qty: 90 | Refills: 1 | Status: DISCONTINUED | COMMUNITY
Start: 2019-03-13 | End: 2020-07-13

## 2020-07-14 ENCOUNTER — APPOINTMENT (OUTPATIENT)
Dept: CARDIOLOGY | Facility: CLINIC | Age: 60
End: 2020-07-14
Payer: COMMERCIAL

## 2020-07-14 ENCOUNTER — NON-APPOINTMENT (OUTPATIENT)
Age: 60
End: 2020-07-14

## 2020-07-14 VITALS
OXYGEN SATURATION: 98 % | WEIGHT: 243 LBS | SYSTOLIC BLOOD PRESSURE: 120 MMHG | BODY MASS INDEX: 34.02 KG/M2 | HEIGHT: 71 IN | HEART RATE: 61 BPM | DIASTOLIC BLOOD PRESSURE: 75 MMHG | RESPIRATION RATE: 16 BRPM | TEMPERATURE: 97.7 F

## 2020-07-14 PROCEDURE — 93000 ELECTROCARDIOGRAM COMPLETE: CPT

## 2020-07-14 PROCEDURE — 99214 OFFICE O/P EST MOD 30 MIN: CPT

## 2020-07-14 NOTE — ASSESSMENT
[FreeTextEntry1] : ECG: Sinus rhythm at 61 BPM. Non specific T T-wave abnormality.\par \par Lab data  None current\par \par 10/23/19\par Chol. 135\par LDL    65\par HDL   48\par A1C   5.5\par Alk. Phos. 122\par Creat. 1.15\par Platelets 90(88 in Feb 2019)\par WBC 11 ( 10.1 Feb. 2019)\par \par Echocardiogram 11/18/19:\par Upper limits of normal wall thickness.\par Mild inferior well hypokinesis\par LVEF 55%\par Mild left atrial enlargement\par Mild dilatation of the aortic root.\par In comparison to the prior echocardiogram there is noted improvement in left ventricular systolic function\par \par \par Hospital data:\par [facility]  2/8/19:\par Severe global left ventricular systolic dysfunction. LVEF 20-25%.\par RV normal size with severely reduced function.\par Severe left atrial enlargement.\par Moderate right atrial enlargement.\par Mild mitral and tricuspid insufficiency.\par \par BNP 4439\par Creatinine as high as 1.38\par \par Impression:\par 1.S/P repeat DCC 2/8/19. \par    Status post pulmonary vein and tricuspid valve isthmus ablation for atrial fibrillation and flutter. 3/8/19. \par    SR on ECG today. No clinical recurrence of which he was aware\par \par 2.weight gain since April, BMI now 33.9, he understands implications\par \par 2. Severe biventricular systolic dysfunction that has resolved\par \par 3. Hypertension  continues to be  controlled with addition of Nifedipine, today 122/80 and apparently controlled at     home \par \par 4.No signs of angina despite a prior history of coronary artery disease and LAD stenting.\par \par 5. No HF on Lasix QD. Denies SOB, no edema on exam.\par \par 6. Suboptimal treatment of Obstructive sleep apnea. \par \par 7. Low platelets which he states is chronic and saw hematology years ago. \par \par \par Plan:\par 1. Continue the  nifedipine XL 60 mg medical regimen.\par \par 2. Continue to exercise as tolerated with diet modification. Must lose the 20 lbs\par \par 4. F/U with pulm. Restart CPAP treatment with better compliance.\par \par 5. F/U with hematology\par \par 6. 2 day stress MIBI\par \par 7. Full Panel of labs\par \par Clinical follow up after testing is completed. \par

## 2020-07-14 NOTE — HISTORY OF PRESENT ILLNESS
[FreeTextEntry1] : Denies any chest pain, SOB, edema or palps. There are no exertional complaints. \par \par \par He is using his CPAP about 50% of the time.\par His exercise is on bike daily without difficulty\par He admits to dietary and discretion.\par Has not experienced any palpitations consistent with recurrent atrial fibrillation.\par There is been no increasing shortness of breath or r edema.\par \par Home BP usually 120/80 mm Hg

## 2020-07-14 NOTE — REASON FOR VISIT
[FreeTextEntry1] : 59 -year-old presents here for cardiac reevaluation. His medical history includes : \par \par 1.PAF\par 2.RHETT\par 3.Obesity\par 4. Dilated cardiomyopathy\par 5.H/O heart artery stent\par 6. Hyperlipidemia\par 7. HTN\par \par On 3/8/19 he successful had a pulmonary vein and tricuspid valve isthmus ablation for rapid a-fib with difficulty controlling ventricular rates and secondary heart failure with left ventricular dysfunction. \par  Subsequently was placed on furosemide 20 mg  BID after his procedure for SOB. This is now taken once a day. \par \par Admits to not using his CPAP because he "does not snore anymore" and feels well. \par \par Home blood pressures running 120/80s\par \par States he is compliant with his cardiac medications, thinks Nifedipine ER is making him shaky.\par \par \par \par His other history includes that of:\par 1.  Prior coronary artery disease and stenting. \par 2.  A dilated cardiomyopathy with hypertensive heart disease that seemed to improve with treatment of hypertension and sleep apnea and restoration in sinus rhythm in the past. \par 3.  Obstructive sleep apnea which he had been somewhat noncompliant with treatment until recently. \par 4.  Obesity, now actively dieting and losing weight\par 5.  Hypertension not always so well controlled. \par 6.  He also has a history of QT prolongation with excessive amiodarone use.  The amiodarone has been discontinued.  \par

## 2020-08-12 ENCOUNTER — APPOINTMENT (OUTPATIENT)
Dept: CARDIOLOGY | Facility: CLINIC | Age: 60
End: 2020-08-12
Payer: COMMERCIAL

## 2020-08-12 PROCEDURE — 93015 CV STRESS TEST SUPVJ I&R: CPT

## 2020-08-12 PROCEDURE — A9500: CPT

## 2020-08-12 PROCEDURE — 78452 HT MUSCLE IMAGE SPECT MULT: CPT

## 2020-08-12 RX ORDER — AMINOPHYLLINE 25 MG/ML
25 INJECTION, SOLUTION INTRAVENOUS
Qty: 0 | Refills: 0 | Status: COMPLETED | OUTPATIENT
Start: 2020-08-12

## 2020-08-12 RX ORDER — REGADENOSON 0.08 MG/ML
0.4 INJECTION, SOLUTION INTRAVENOUS
Qty: 4 | Refills: 0 | Status: COMPLETED | OUTPATIENT
Start: 2020-08-12

## 2020-08-12 RX ADMIN — AMINOPHYLLINE 0 MG/ML: 25 INJECTION, SOLUTION INTRAVENOUS at 00:00

## 2020-08-12 RX ADMIN — REGADENOSON 0 MG/5ML: 0.08 INJECTION, SOLUTION INTRAVENOUS at 00:00

## 2020-08-14 ENCOUNTER — APPOINTMENT (OUTPATIENT)
Dept: CARDIOLOGY | Facility: CLINIC | Age: 60
End: 2020-08-14

## 2020-09-10 RX ORDER — KIT FOR THE PREPARATION OF TECHNETIUM TC99M SESTAMIBI 1 MG/5ML
INJECTION, POWDER, LYOPHILIZED, FOR SOLUTION PARENTERAL
Refills: 0 | Status: COMPLETED | OUTPATIENT
Start: 2020-09-10

## 2020-09-10 RX ADMIN — KIT FOR THE PREPARATION OF TECHNETIUM TC99M SESTAMIBI 0: 1 INJECTION, POWDER, LYOPHILIZED, FOR SOLUTION PARENTERAL at 00:00

## 2020-09-14 ENCOUNTER — APPOINTMENT (OUTPATIENT)
Dept: CARDIOLOGY | Facility: CLINIC | Age: 60
End: 2020-09-14
Payer: COMMERCIAL

## 2020-09-14 NOTE — ASSESSMENT
[FreeTextEntry1] : ECG: Sinus rhythm at 61 BPM. Non specific T T-wave abnormality\par \par 10/23/19\par Chol. 135\par LDL    65\par HDL   48\par A1C   5.5\par Alk. Phos. 122\par Creat. 1.15\par Platelets 90(88 in Feb 2019)\par WBC 11 ( 10.1 Feb. 2019)\par \par Echocardiogram 11/18/19:\par Upper limits of normal wall thickness.\par Mild inferior well hypokinesis\par LVEF 55%\par Mild left atrial enlargement\par Mild dilatation of the aortic root.\par In comparison to the prior echocardiogram there is noted improvement in left ventricular systolic function\par \par \par Hospital data:\par [facility]  2/8/19:\par Severe global left ventricular systolic dysfunction. LVEF 20-25%.\par RV normal size with severely reduced function.\par Severe left atrial enlargement.\par Moderate right atrial enlargement.\par Mild mitral and tricuspid insufficiency.\par \par BNP 4439\par Creatinine as high as 1.38\par \par Impression:\par 1.S/P repeat DCC 2/8/19. \par    Status post pulmonary vein and tricuspid valve isthmus ablation for atrial fibrillation and flutter. 3/8/19. \par    SR on ECG today. No clinical recurrence of which he was aware\par \par 2.weight gain since April, BMI now 33.9, he understands implications\par \par 2. Severe biventricular systolic dysfunction that has resolved\par \par 3. Hypertension  continues to be  controlled with addition of Nifedipine, today 122/80 and apparently controlled at     home \par \par 4.No signs of angina despite a prior history of coronary artery disease and LAD stenting.\par \par 5. No HF on Lasix QD. Denies SOB, no edema on exam.\par \par 6. Suboptimal treatment of Obstructive sleep apnea. \par \par 7. Low platelets which he states is chronic and saw hematology years ago. \par \par \par Plan:\par 1. Continue the  nifedipine XL 60 mg medical regimen.\par \par 2. Continue to exercise as tolerated with diet modification. Must lose the 20 lbs\par \par 4. F/U with pulm. Restart CPAP treatment with better compliance.\par \par 5. F/U with hematology\par \par 6. 2 day stress MIBI\par \par 7. Full Panel of labs\par \par Clinical follow up after testing is completed. \par

## 2020-09-25 ENCOUNTER — APPOINTMENT (OUTPATIENT)
Dept: CARDIOLOGY | Facility: CLINIC | Age: 60
End: 2020-09-25
Payer: COMMERCIAL

## 2020-09-25 ENCOUNTER — NON-APPOINTMENT (OUTPATIENT)
Age: 60
End: 2020-09-25

## 2020-09-25 VITALS
HEIGHT: 71 IN | RESPIRATION RATE: 16 BRPM | OXYGEN SATURATION: 98 % | SYSTOLIC BLOOD PRESSURE: 125 MMHG | TEMPERATURE: 97.2 F | WEIGHT: 243 LBS | DIASTOLIC BLOOD PRESSURE: 75 MMHG | HEART RATE: 64 BPM | BODY MASS INDEX: 34.02 KG/M2

## 2020-09-25 DIAGNOSIS — J45.909 UNSPECIFIED ASTHMA, UNCOMPLICATED: ICD-10-CM

## 2020-09-25 PROCEDURE — 93000 ELECTROCARDIOGRAM COMPLETE: CPT

## 2020-09-25 PROCEDURE — 99214 OFFICE O/P EST MOD 30 MIN: CPT

## 2020-09-25 RX ORDER — KRILL/OM-3/DHA/EPA/PHOSPHO/AST 1000-230MG
81 CAPSULE ORAL DAILY
Qty: 90 | Refills: 0 | Status: ACTIVE | COMMUNITY
Start: 2020-09-25

## 2020-09-25 NOTE — REVIEW OF SYSTEMS
[Recent Weight Gain (___ Lbs)] : no recent weight gain [FreeTextEntry1] : Other than as documented here and in the HPI, the thirteen point ROS is negative

## 2020-09-25 NOTE — ASSESSMENT
[FreeTextEntry1] : ECG: Sinus rhythm at 64 BPM. Non specific T T-wave abnormality.\par \par      10/23/19   9/22/20\par Chol.   135       161\par LDL      65         89\par HDL     48         47\par A1C     5.5       5.6\par Alk. Phos. 122\par Creat. 1.15\par Platelets 90(88 in Feb 2019)\par WBC 11 ( 10.1 Feb. 2019)\par \par \par Pharmacologic sestamibi stress test 8/12/2020:\par Artifact but no evidence of ischemia.  Gated left ventricular ejection fraction 56%\par \par Echocardiogram 11/18/19:\par Upper limits of normal wall thickness.\par Mild inferior well hypokinesis\par LVEF 55%\par Mild left atrial enlargement\par Mild dilatation of the aortic root.\par In comparison to the prior echocardiogram there is noted improvement in left ventricular systolic function\par \par Hospital data:\par [facility]  2/8/19:\par Severe global left ventricular systolic dysfunction. LVEF 20-25%.\par RV normal size with severely reduced function.\par Severe left atrial enlargement.\par Moderate right atrial enlargement.\par Mild mitral and tricuspid insufficiency.\par \par BNP 4439\par Creatinine as high as 1.38\par \par Impression:\par 1.S/P repeat DCC 2/8/19. \par    Status post pulmonary vein and tricuspid valve isthmus ablation for atrial fibrillation and flutter. 3/8/19. \par    SR on ECG today. No clinical recurrence of which he was aware\par \par 2.weight gain since April, BMI now 33.9, he understands implications\par \par 2. Severe biventricular systolic dysfunction that had resolved on the last echo\par \par 3. Hypertension  continues to be  controlled with addition of Nifedipine; apparently controlled at     home \par \par 4.No signs of angina despite a prior history of coronary artery disease and LAD stenting.\par    No evidence of ischemia on the recent pharmacologic nuclear stress test\par \par 5. No HF on Lasix QD. Denies SOB, no edema on exam.\par \par 6. Suboptimal treatment of Obstructive sleep apnea.  Only 50% compliance by his report\par \par 7. Low platelets which he states is chronic and saw hematology years ago. \par \par \par Plan:\par 1. Continue the  nifedipine XL 60 mg medical regimen.\par \par 2. Continue to exercise as tolerated with diet modification. Must lose the 20 lbs\par \par 4. F/U with pulm. Restart CPAP treatment with better compliance.\par \par 5. F/U with hematology\par \par 6.  Repeat echocardiogram in the near future\par \par 7. Full Panel of labs in 4 months\par \par Clinical follow up after testing is completed. \par

## 2020-09-25 NOTE — REASON FOR VISIT
[FreeTextEntry1] : 59 -year-old presents here for cardiac reevaluation. His medical history includes : \par \par 1.PAF\par 2.RHETT\par 3.Obesity\par 4. Dilated cardiomyopathy\par 5.H/O heart artery stent\par 6. Hyperlipidemia\par 7. HTN\par \par On 3/8/19 he successful had a pulmonary vein and tricuspid valve isthmus ablation for rapid a-fib with difficulty controlling ventricular rates and secondary heart failure with left ventricular dysfunction. \par  Subsequently was placed on furosemide 20 mg  BID after his procedure for SOB. This is now taken once a day. \par \par Admits to not using his CPAP consistently \par \par Home blood pressures running 120/80s\par \par States he is compliant with his cardiac medications, thinks Nifedipine ER is making him shaky.\par \par His other history includes that of:\par 1.  Prior coronary artery disease and stenting. \par 2.  A dilated cardiomyopathy with hypertensive heart disease that seemed to improve with treatment of hypertension and sleep apnea and restoration in sinus rhythm in the past. \par 3.  Obstructive sleep apnea which he had been somewhat noncompliant with treatment until recently. \par 4.  Obesity, now actively dieting \par 5.  Hypertension not always so well controlled. \par 6.  He also has a history of QT prolongation with excessive amiodarone use.  The amiodarone has been discontinued.  \par

## 2020-10-28 ENCOUNTER — RX RENEWAL (OUTPATIENT)
Age: 60
End: 2020-10-28

## 2021-02-17 ENCOUNTER — APPOINTMENT (OUTPATIENT)
Dept: CARDIOLOGY | Facility: CLINIC | Age: 61
End: 2021-02-17
Payer: COMMERCIAL

## 2021-02-17 PROCEDURE — 99072 ADDL SUPL MATRL&STAF TM PHE: CPT

## 2021-02-17 PROCEDURE — 93306 TTE W/DOPPLER COMPLETE: CPT

## 2021-02-17 RX ORDER — PERFLUTREN 6.52 MG/ML
6.52 INJECTION, SUSPENSION INTRAVENOUS
Qty: 2 | Refills: 0 | Status: COMPLETED | OUTPATIENT
Start: 2021-02-17

## 2021-02-17 RX ADMIN — PERFLUTREN MG/ML: 6.52 INJECTION, SUSPENSION INTRAVENOUS at 00:00

## 2021-03-03 ENCOUNTER — APPOINTMENT (OUTPATIENT)
Dept: CARDIOLOGY | Facility: CLINIC | Age: 61
End: 2021-03-03
Payer: COMMERCIAL

## 2021-03-03 VITALS
TEMPERATURE: 98.2 F | DIASTOLIC BLOOD PRESSURE: 87 MMHG | HEIGHT: 71 IN | OXYGEN SATURATION: 98 % | HEART RATE: 76 BPM | BODY MASS INDEX: 35 KG/M2 | SYSTOLIC BLOOD PRESSURE: 138 MMHG | RESPIRATION RATE: 16 BRPM | WEIGHT: 250 LBS

## 2021-03-03 DIAGNOSIS — L30.8 OTHER SPECIFIED DERMATITIS: ICD-10-CM

## 2021-03-03 PROCEDURE — 99214 OFFICE O/P EST MOD 30 MIN: CPT

## 2021-03-03 PROCEDURE — 93000 ELECTROCARDIOGRAM COMPLETE: CPT

## 2021-03-03 PROCEDURE — 99072 ADDL SUPL MATRL&STAF TM PHE: CPT

## 2021-03-19 DIAGNOSIS — I10 ESSENTIAL (PRIMARY) HYPERTENSION: ICD-10-CM

## 2021-03-19 DIAGNOSIS — G47.33 OBSTRUCTIVE SLEEP APNEA (ADULT) (PEDIATRIC): ICD-10-CM

## 2021-03-19 DIAGNOSIS — I42.0 DILATED CARDIOMYOPATHY: ICD-10-CM

## 2021-03-19 DIAGNOSIS — R05 COUGH: ICD-10-CM

## 2021-03-19 DIAGNOSIS — E66.9 OBESITY, UNSPECIFIED: ICD-10-CM

## 2021-03-19 DIAGNOSIS — I48.0 PAROXYSMAL ATRIAL FIBRILLATION: ICD-10-CM

## 2021-03-19 DIAGNOSIS — E78.5 HYPERLIPIDEMIA, UNSPECIFIED: ICD-10-CM

## 2021-03-19 DIAGNOSIS — Z95.5 PRESENCE OF CORONARY ANGIOPLASTY IMPLANT AND GRAFT: ICD-10-CM

## 2021-03-19 NOTE — ASSESSMENT
[FreeTextEntry1] : ECG: Sinus rhythm at 76 BPM. Non specific T T-wave abnormality.\par \par      10/23/19   9/22/20\par Chol.   135       161\par LDL      65         89\par HDL     48         47\par A1C     5.5       5.6\par Alk. Phos. 122\par Creat. 1.15\par Platelets 90(88 in Feb 2019)\par WBC 11 ( 10.1 Feb. 2019)\par \par Echocardiogram 2/17/2021:\par LVH with normal left ventricular systolic function ejection fraction 65%\par Diastolic dysfunction\par Moderate left atrial enlargement and right atrial enlargement\par Calcific mitral valve disease without significant regurgitation or stenosis\par \par Pharmacologic sestamibi stress test 8/12/2020:\par Artifact but no evidence of ischemia.  Gated left ventricular ejection fraction 56%\par \par Echocardiogram 11/18/19:\par Upper limits of normal wall thickness.\par Mild inferior well hypokinesis\par LVEF 55%\par Mild left atrial enlargement\par Mild dilatation of the aortic root.\par In comparison to the prior echocardiogram there is noted improvement in left ventricular systolic function\par \par Hospital data:\par [facility]  2/8/19:\par Severe global left ventricular systolic dysfunction. LVEF 20-25%.\par RV normal size with severely reduced function.\par Severe left atrial enlargement.\par Moderate right atrial enlargement.\par Mild mitral and tricuspid insufficiency.\par \par BNP 4439\par Creatinine as high as 1.38\par \par Impression:\par 1.S/P repeat DCC 2/8/19. \par    Status post pulmonary vein and tricuspid valve isthmus ablation for atrial fibrillation and flutter. 3/8/19. \par    SR on ECG today. No clinical recurrence of which he was aware\par \par 2.weight gain since April, BMI now nearly 35, he understands implications, which were discussed in detail\par \par 2. Severe biventricular systolic dysfunction that has resolved on the last 2 echos\par \par 3. Hypertension  continues to be  controlled with addition of Nifedipine; apparently controlled at  home \par \par 4.No signs of angina despite a prior history of coronary artery disease and LAD stenting.\par    No evidence of ischemia on the recent pharmacologic nuclear stress test\par \par 5. No HF on Lasix QD. Denies SOB, no edema on exam.\par \par 6. Suboptimal treatment of Obstructive sleep apnea.  Only 50% compliance by his report\par     he understands the implications of his decisions\par \par 7. Low platelets which he states is chronic and saw hematology years ago. \par \par \par Plan:\par 1. Continue the  nifedipine XL 60 mg medical regimen.\par \par 2. Continue to exercise as tolerated with diet modification. Must lose the 20 lbs\par \par 4. F/U with pulm. Restart CPAP treatment with better compliance.\par \par 5. F/U with hematology\par \par 6.  Full Panel of labs \par \par

## 2021-03-19 NOTE — REASON FOR VISIT
4 sutures removed from healing wound on left hand. No signs of infection   [FreeTextEntry1] : 60  -year-old presents here for cardiac reevaluation. His medical history includes : \par \par 1.PAF\par 2.RHETT\par 3.Obesity\par 4. Dilated cardiomyopathy\par 5.H/O heart artery stent\par 6. Hyperlipidemia\par 7. HTN\par \par On 3/8/19:    pulmonary vein and tricuspid valve isthmus ablation for rapid a-fib\par Subsequently was placed on furosemide 20 mg  BID after his procedure for SOB. This is now taken once a day. \par \par Admits to not using his CPAP consistently \par \par Home blood pressures running 120/80s\par \par States he is compliant with his cardiac medications, thinks Nifedipine ER is making him shaky.\par \par His other history includes that of:\par 1.  Prior coronary artery disease and stenting. \par 2.  A dilated cardiomyopathy with hypertensive heart disease that seemed to improve with treatment of hypertension and sleep apnea and restoration in sinus rhythm in the past. \par 3.  Obstructive sleep apnea which he had been somewhat noncompliant with treatment until recently. \par 4.  Obesity, now actively dieting \par 5.  Hypertension not always so well controlled. \par 6.  He also has a history of QT prolongation with excessive amiodarone use.  The amiodarone has been      discontinued.  \par

## 2021-03-19 NOTE — HISTORY OF PRESENT ILLNESS
[FreeTextEntry1] : Denies any chest pain, SOB, edema or palps. There are no exertional complaints. \par \par \par He is using his CPAP less than 50% of the time.\par His exercise is on bike daily without difficulty\par He admits to dietary and discretion.\par Has not experienced any palpitations consistent with recurrent atrial fibrillation.\par There is been no increasing shortness of breath or r edema.\par \par Home BP usually 120/80 mm Hg

## 2021-05-20 ENCOUNTER — TRANSCRIPTION ENCOUNTER (OUTPATIENT)
Age: 61
End: 2021-05-20

## 2021-06-23 ENCOUNTER — RX RENEWAL (OUTPATIENT)
Age: 61
End: 2021-06-23

## 2021-06-23 RX ORDER — METOPROLOL SUCCINATE 25 MG/1
25 TABLET, EXTENDED RELEASE ORAL DAILY
Qty: 180 | Refills: 1 | Status: ACTIVE | COMMUNITY
Start: 2020-10-28 | End: 1900-01-01

## 2021-06-23 RX ORDER — NIFEDIPINE 60 MG/1
60 TABLET, FILM COATED, EXTENDED RELEASE ORAL
Qty: 90 | Refills: 1 | Status: ACTIVE | COMMUNITY
Start: 2020-05-27 | End: 1900-01-01

## 2021-08-30 ENCOUNTER — APPOINTMENT (OUTPATIENT)
Dept: CARDIOLOGY | Facility: CLINIC | Age: 61
End: 2021-08-30

## 2021-10-29 ENCOUNTER — RX RENEWAL (OUTPATIENT)
Age: 61
End: 2021-10-29

## 2022-01-25 ENCOUNTER — NON-APPOINTMENT (OUTPATIENT)
Age: 62
End: 2022-01-25

## 2022-01-31 RX ORDER — APIXABAN 5 MG/1
5 TABLET, FILM COATED ORAL
Qty: 180 | Refills: 1 | Status: DISCONTINUED | COMMUNITY
Start: 2018-06-08 | End: 2022-01-31

## 2022-01-31 RX ORDER — RIVAROXABAN 20 MG/1
20 TABLET, FILM COATED ORAL
Qty: 90 | Refills: 2 | Status: ACTIVE | COMMUNITY
Start: 2022-01-31 | End: 1900-01-01

## 2022-04-12 ENCOUNTER — RX RENEWAL (OUTPATIENT)
Age: 62
End: 2022-04-12

## 2022-04-12 RX ORDER — ATORVASTATIN CALCIUM 40 MG/1
40 TABLET, FILM COATED ORAL
Qty: 90 | Refills: 3 | Status: ACTIVE | COMMUNITY
Start: 2018-06-08 | End: 1900-01-01

## 2022-06-30 ENCOUNTER — NON-APPOINTMENT (OUTPATIENT)
Age: 62
End: 2022-06-30

## 2022-08-09 ENCOUNTER — RX RENEWAL (OUTPATIENT)
Age: 62
End: 2022-08-09

## 2022-08-09 RX ORDER — LISINOPRIL 30 MG/1
30 TABLET ORAL
Qty: 30 | Refills: 1 | Status: ACTIVE | COMMUNITY
Start: 2018-12-21 | End: 1900-01-01

## 2022-08-09 RX ORDER — VALSARTAN AND HYDROCHLOROTHIAZIDE 160; 12.5 MG/1; MG/1
160-12.5 TABLET, FILM COATED ORAL
Qty: 30 | Refills: 1 | Status: ACTIVE | COMMUNITY
Start: 2019-06-04 | End: 1900-01-01

## 2022-08-16 NOTE — ED ADULT NURSE NOTE - ILLNESS RECENT EXPOSURE
Chief Complaint   Patient presents with   • Annual Exam       History of Present Illness      The patient presents for an established patient physical examination and health maintenance visit.    The patient presents for a follow-up related to depression. He denies currently having depression symptoms. He denies suicidal ideation. His energy level is good. He denies agorophobia. He sleeps well. He is not tearful. He states that his current depression symptoms are stable. He is currently taking a medication. The current medication regimen consists of citalopram. The patient denies having medication side effects including nausea, headaches, anxiety, increased depression, anorgasmia or fatigue.    The patient presents for a follow-up related to hypertension. The patient reports that he has had no headaches, chest pain, dyspnea, edema, syncope, blurred vision or palpitations. He states that he is taking his medication as prescribed. He is not having medication side effects.    The patient presents for a follow-up related to insomnia. He denies feelings of depression and anxiety. There is no history of excessive caffeine usage. There is no history of nocturia. He is taking zolpidem. This has helped the patient sleep better.    The patient falls asleep easily and typically does not awaken at night. The patient does not snore and denies gasping or stopping breathing at night. He feels well rested during the day and he denies falling asleep while watching television, while driving or during conversation.       Review of Systems    NECK- Denies Decreased Range of Motion, Stiffness, Thyroid Nodules, Enlarged Lymph Nodes or Goiter.    HENT- Denies Nasal Discharge, Sore Throat, Ear Pain, Ear Drainage, Sinus Pain, Nasal Congestion, Decreased Hearing or Tinnitus.    PULMONARY- Denies Wheezing, Sputum Production, Cough, Hemoptysis or Pleuritic Chest Pain.    CARDIOVASCULAR- Denies Claudication or Irregular Heart  "Beat.    Medications      Current Outpatient Medications:   •  citalopram (CeleXA) 40 MG tablet, TAKE ONE TABLET BY MOUTH DAILY, Disp: 30 tablet, Rfl: 5  •  lisinopril-hydrochlorothiazide (PRINZIDE,ZESTORETIC) 20-25 MG per tablet, TAKE ONE TABLET BY MOUTH DAILY, Disp: 90 tablet, Rfl: 1  •  metoprolol succinate XL (TOPROL-XL) 50 MG 24 hr tablet, TAKE ONE TABLET BY MOUTH DAILY, Disp: 30 tablet, Rfl: 5  •  zolpidem (AMBIEN) 10 MG tablet, TAKE ONE TABLET BY MOUTH EVERY NIGHT AT BEDTIME, Disp: 30 tablet, Rfl: 2     Allergies    No Known Allergies    Problem List    Patient Active Problem List   Diagnosis   • Anxiety disorder   • Hyperlipidemia   • Hypertension   • Insomnia   • Benign non-nodular prostatic hyperplasia without lower urinary tract symptoms   • Cerumen impaction   • Depression   • Tobacco abuse   • Cigarette smoker       Medications, Allergies, Problems List and Past History were reviewed and updated.    Physical Examination    /76 (BP Location: Left arm, Patient Position: Sitting, Cuff Size: Adult)   Pulse 72   Temp 96.8 °F (36 °C) (Infrared)   Resp 16   Ht 176.5 cm (69.5\")   Wt 104 kg (228 lb 12.8 oz)   BMI 33.30 kg/m²     HEENT: Head- Normocephalic Atraumatic. Facies- Within normal limits. Pinnas- Normal texture and shape bilaterally. Canals- Normal bilaterally. TMs- Normal bilaterally. Nares- Patent bilaterally. Nasal Septum- is normal. There is no tenderness to palpation over the frontal or maxillary sinuses. Lids- Normal bilaterally. Conjunctiva- Clear bilaterally. Sclera- Anicteric bilaterally. Oropharynx- Moist with no lesions. Tonsils- No enlargement, erythema or exudate.    Neck: Thyroid- non enlarged, symmetric and has no nodules. No bruits are detected. ROM- Normal Range of Motion with no rigidity.    Lungs: Auscultation- Clear to auscultation bilaterally. There are no retractions, clubbing or cyanosis. The Expiratory to Inspiratory ratio is equal.    Lymph Nodes: Cervical- no " enlarged lymph nodes noted. Clavicular- no enlarged supraclavicular lymph nodes noted. Axillary- no enlarged axillary lymph nodes noted. Inguinal- no enlarged inguinal lymph nodes noted.    Cardiovascular: There are no carotid bruits. Heart- Normal Rate with Regular rhythm and no murmurs. There are no gallops. There are no rubs. In the lower extremities there is no edema. The upper extremities do not have edema.    Abdomen: Soft, benign, non-tender with no masses, hernias, organomegaly or scars.    Male Genitourinary: The penis is circumcised with testicles found in the scrotum bilaterally. Testicular Size is normal bilaterally. The penis has no anatomic abnormalities.    Rectal: reveals normal external sphincter tone with no external lesions.    Prostate: The prostate gland is symmetric and smooth with no nodularity.    Dermatologic: The patient has no worrisome or suspicious skin lesions noted.    Impression and Assessment    Normal Physical Examination.    Encounter for Screening for Malignant Neoplasm of the Colon.    Encounter for Screening for Malignant Neoplasm of the Prostate.    Reactive Depression.    Essential Hypertension.    Insomnia.    Plan    Essential Hypertension Plan: The patient was instructed to continue the current medications.    Reactive Depression Plan: The patient was instructed to continue the current medications.    Insomnia Plan: The patient was instructed to continue the current medications.    Counseling was provided regarding: Adequate Aerobic Exercise, Dental Visits, Flossing Teeth, Heart Healthy Diet and Seat Belt Utilization.    The following was ordered for screening and health maintenance: Colonoscopy, Lipid Profile and PSA.       Immunizations Ordered and Administered: None.    Diagnoses and all orders for this visit:    1. Physical exam (Primary)    2. Essential hypertension  -     CBC & Differential; Future  -     Comprehensive Metabolic Panel; Future  -     TSH; Future  -      POC Urinalysis Dipstick, Automated; Future    3. Insomnia, unspecified type    4. Reactive depression    5. Prostate cancer screening  -     PSA Screen; Future    6. Screening for colon cancer  -     Ambulatory Referral For Screening Colonoscopy    7. Screening for lipid disorders  -     Lipid Panel; Future        Return to Office    The patient was instructed to return for follow-up in 6 months. The patient was instructed to return sooner if the condition changes, worsens, or does not resolve.   None known

## 2024-09-30 NOTE — ED PROVIDER NOTE - PMH
Coronary artery disease involving native coronary artery of native heart, angina presence unspecified    HTN (hypertension)  non compliant with meds  RHETT treated with BiPAP    Pure hypercholesterolemia    Uncomplicated asthma, unspecified asthma severity Home

## 2024-11-01 ENCOUNTER — APPOINTMENT (OUTPATIENT)
Dept: ORTHOPEDIC SURGERY | Facility: CLINIC | Age: 64
End: 2024-11-01
Payer: COMMERCIAL

## 2024-11-01 VITALS — HEIGHT: 71 IN | WEIGHT: 235 LBS | BODY MASS INDEX: 32.9 KG/M2

## 2024-11-01 DIAGNOSIS — M16.12 UNILATERAL PRIMARY OSTEOARTHRITIS, LEFT HIP: ICD-10-CM

## 2024-11-01 DIAGNOSIS — M76.892 OTHER SPECIFIED ENTHESOPATHIES OF LEFT LOWER LIMB, EXCLUDING FOOT: ICD-10-CM

## 2024-11-01 PROCEDURE — 99244 OFF/OP CNSLTJ NEW/EST MOD 40: CPT

## 2024-11-01 PROCEDURE — 73502 X-RAY EXAM HIP UNI 2-3 VIEWS: CPT

## 2024-11-01 RX ORDER — MELOXICAM 15 MG/1
15 TABLET ORAL
Qty: 30 | Refills: 1 | Status: ACTIVE | COMMUNITY
Start: 2024-11-01 | End: 1900-01-01

## 2024-11-14 NOTE — H&P PST ADULT - MALLAMPATI CLASS
N/A
Class I (easy) - visualization of the soft palate, fauces, uvula, and both anterior and posterior pillars